# Patient Record
Sex: FEMALE | Race: WHITE | NOT HISPANIC OR LATINO | Employment: UNEMPLOYED | URBAN - METROPOLITAN AREA
[De-identification: names, ages, dates, MRNs, and addresses within clinical notes are randomized per-mention and may not be internally consistent; named-entity substitution may affect disease eponyms.]

---

## 2017-01-05 RX ORDER — MODAFINIL 100 MG/1
TABLET ORAL
Qty: 60 TABLET | Refills: 0 | Status: SHIPPED | OUTPATIENT
Start: 2017-01-05 | End: 2017-02-03 | Stop reason: SDUPTHER

## 2017-01-12 ENCOUNTER — NURSE TRIAGE (OUTPATIENT)
Dept: ADMINISTRATIVE | Facility: CLINIC | Age: 32
End: 2017-01-12

## 2017-01-12 NOTE — TELEPHONE ENCOUNTER
Reason for Disposition   Caller has already spoken with another triager and has no further questions.    Protocols used: ST NO CONTACT OR DUPLICATE CONTACT CALL-A-AH

## 2017-02-07 RX ORDER — METHOCARBAMOL 500 MG/1
TABLET, FILM COATED ORAL
Qty: 40 TABLET | Refills: 0 | Status: SHIPPED | OUTPATIENT
Start: 2017-02-07 | End: 2017-02-09

## 2017-02-07 RX ORDER — MODAFINIL 100 MG/1
TABLET ORAL
Qty: 60 TABLET | Refills: 0 | Status: SHIPPED | OUTPATIENT
Start: 2017-02-07 | End: 2017-03-12 | Stop reason: SDUPTHER

## 2017-02-09 ENCOUNTER — PATIENT MESSAGE (OUTPATIENT)
Dept: FAMILY MEDICINE | Facility: CLINIC | Age: 32
End: 2017-02-09

## 2017-02-09 ENCOUNTER — OFFICE VISIT (OUTPATIENT)
Dept: FAMILY MEDICINE | Facility: CLINIC | Age: 32
End: 2017-02-09
Payer: COMMERCIAL

## 2017-02-09 VITALS
WEIGHT: 194 LBS | DIASTOLIC BLOOD PRESSURE: 70 MMHG | RESPIRATION RATE: 16 BRPM | HEIGHT: 64 IN | HEART RATE: 70 BPM | SYSTOLIC BLOOD PRESSURE: 126 MMHG | BODY MASS INDEX: 33.12 KG/M2

## 2017-02-09 DIAGNOSIS — J45.20 ALLERGIC BRONCHITIS, MILD INTERMITTENT, UNCOMPLICATED: ICD-10-CM

## 2017-02-09 DIAGNOSIS — D47.09 MASTOCYTOSIS: ICD-10-CM

## 2017-02-09 DIAGNOSIS — R51.9 HEADACHE, UNSPECIFIED HEADACHE TYPE: ICD-10-CM

## 2017-02-09 DIAGNOSIS — K21.9 GASTROESOPHAGEAL REFLUX DISEASE, ESOPHAGITIS PRESENCE NOT SPECIFIED: ICD-10-CM

## 2017-02-09 DIAGNOSIS — M79.7 FIBROMYALGIA: ICD-10-CM

## 2017-02-09 DIAGNOSIS — D50.9 IRON DEFICIENCY ANEMIA, UNSPECIFIED IRON DEFICIENCY ANEMIA TYPE: Primary | ICD-10-CM

## 2017-02-09 LAB
ALBUMIN SERPL BCP-MCNC: 4.2 G/DL
ALP SERPL-CCNC: 87 U/L
ALT SERPL W/O P-5'-P-CCNC: 19 U/L
ANION GAP SERPL CALC-SCNC: 10 MMOL/L
ANISOCYTOSIS BLD QL SMEAR: SLIGHT
AST SERPL-CCNC: 20 U/L
BASOPHILS # BLD AUTO: 0.05 K/UL
BASOPHILS NFR BLD: 0.5 %
BILIRUB SERPL-MCNC: 0.1 MG/DL
BUN SERPL-MCNC: 11 MG/DL
CALCIUM SERPL-MCNC: 9.4 MG/DL
CHLORIDE SERPL-SCNC: 106 MMOL/L
CO2 SERPL-SCNC: 24 MMOL/L
CREAT SERPL-MCNC: 0.9 MG/DL
DIFFERENTIAL METHOD: ABNORMAL
EOSINOPHIL # BLD AUTO: 0.2 K/UL
EOSINOPHIL NFR BLD: 1.6 %
ERYTHROCYTE [DISTWIDTH] IN BLOOD BY AUTOMATED COUNT: 18.2 %
EST. GFR  (AFRICAN AMERICAN): >60 ML/MIN/1.73 M^2
EST. GFR  (NON AFRICAN AMERICAN): >60 ML/MIN/1.73 M^2
GLUCOSE SERPL-MCNC: 98 MG/DL
HCT VFR BLD AUTO: 32.4 %
HGB BLD-MCNC: 9.4 G/DL
LYMPHOCYTES # BLD AUTO: 2.2 K/UL
LYMPHOCYTES NFR BLD: 20.4 %
MCH RBC QN AUTO: 19.6 PG
MCHC RBC AUTO-ENTMCNC: 29 %
MCV RBC AUTO: 68 FL
MONOCYTES # BLD AUTO: 0.6 K/UL
MONOCYTES NFR BLD: 5.3 %
NEUTROPHILS # BLD AUTO: 7.9 K/UL
NEUTROPHILS NFR BLD: 72.6 %
PLATELET # BLD AUTO: 332 K/UL
PMV BLD AUTO: 11.9 FL
POIKILOCYTOSIS BLD QL SMEAR: SLIGHT
POTASSIUM SERPL-SCNC: 3.4 MMOL/L
PROT SERPL-MCNC: 8.1 G/DL
RBC # BLD AUTO: 4.8 M/UL
SCHISTOCYTES BLD QL SMEAR: PRESENT
SODIUM SERPL-SCNC: 140 MMOL/L
WBC # BLD AUTO: 10.97 K/UL

## 2017-02-09 PROCEDURE — 99214 OFFICE O/P EST MOD 30 MIN: CPT | Mod: S$GLB,,, | Performed by: FAMILY MEDICINE

## 2017-02-09 PROCEDURE — 80053 COMPREHEN METABOLIC PANEL: CPT

## 2017-02-09 PROCEDURE — 83540 ASSAY OF IRON: CPT

## 2017-02-09 PROCEDURE — 99999 PR PBB SHADOW E&M-EST. PATIENT-LVL III: CPT | Mod: PBBFAC,,, | Performed by: FAMILY MEDICINE

## 2017-02-09 PROCEDURE — 85025 COMPLETE CBC W/AUTO DIFF WBC: CPT

## 2017-02-09 PROCEDURE — 82728 ASSAY OF FERRITIN: CPT

## 2017-02-09 PROCEDURE — 36415 COLL VENOUS BLD VENIPUNCTURE: CPT | Mod: S$GLB,,, | Performed by: FAMILY MEDICINE

## 2017-02-09 RX ORDER — DEXLANSOPRAZOLE 60 MG/1
60 CAPSULE, DELAYED RELEASE ORAL DAILY
Qty: 30 CAPSULE | Refills: 1 | Status: SHIPPED | OUTPATIENT
Start: 2017-02-09 | End: 2017-02-09 | Stop reason: SDUPTHER

## 2017-02-09 RX ORDER — NORTRIPTYLINE HYDROCHLORIDE 10 MG/1
10 CAPSULE ORAL NIGHTLY
Qty: 30 CAPSULE | Refills: 3 | Status: SHIPPED | OUTPATIENT
Start: 2017-02-09 | End: 2017-11-03

## 2017-02-09 RX ORDER — DEXLANSOPRAZOLE 60 MG/1
60 CAPSULE, DELAYED RELEASE ORAL DAILY
Qty: 30 CAPSULE | Refills: 3 | Status: SHIPPED | OUTPATIENT
Start: 2017-02-09 | End: 2017-11-03

## 2017-02-09 RX ORDER — METHOCARBAMOL 500 MG/1
500 TABLET, FILM COATED ORAL 2 TIMES DAILY PRN
Qty: 40 TABLET | Refills: 1 | Status: SHIPPED | OUTPATIENT
Start: 2017-02-09 | End: 2017-03-07 | Stop reason: SDUPTHER

## 2017-02-09 RX ORDER — MONTELUKAST SODIUM 10 MG/1
10 TABLET ORAL NIGHTLY
Qty: 30 TABLET | Refills: 5 | Status: SHIPPED | OUTPATIENT
Start: 2017-02-09 | End: 2017-03-11

## 2017-02-09 RX ORDER — DICLOFENAC SODIUM 10 MG/G
2 GEL TOPICAL 4 TIMES DAILY
Qty: 3 TUBE | Refills: 5 | Status: SHIPPED | OUTPATIENT
Start: 2017-02-09 | End: 2017-11-03

## 2017-02-09 RX ORDER — FLUTICASONE PROPIONATE 220 UG/1
1 AEROSOL, METERED RESPIRATORY (INHALATION) 2 TIMES DAILY
Qty: 12 G | Refills: 0
Start: 2017-02-09 | End: 2019-01-15

## 2017-02-09 NOTE — PROGRESS NOTES
Subjective:       Patient ID: Krystal Wren is a 31 y.o. female.    Chief Complaint: Annual Exam    HPI  31 year old female with asthma, anemia, fibromyalgia, depression, SANTIAGO, insomnia and anxiety presents for routine visit. She says she isn't sleeping well. She does better when she uses her Cpap. She notes that her reflux is still bad and that she has issues with stomach aches at times. She is curious about gluten sensitivity, but saw GI and was convinced this may not be the case.     PMH, PSH, ALLERGIES, SH, FH reviewed in nurse's notes above  Medications reconciled in the nurse's notes      Review of Systems   Constitutional: Negative for chills and fever.   HENT: Negative for congestion, ear pain, postnasal drip, rhinorrhea, sore throat and trouble swallowing.    Eyes: Negative for redness and itching.   Respiratory: Negative for cough, shortness of breath and wheezing.    Cardiovascular: Negative for chest pain and palpitations.   Gastrointestinal: Positive for abdominal pain and diarrhea. Negative for nausea and vomiting.   Genitourinary: Negative for dysuria and frequency.   Skin: Negative for rash.   Neurological: Negative for weakness and headaches.   Psychiatric/Behavioral: Positive for sleep disturbance. The patient is nervous/anxious.        Objective:      Physical Exam   Constitutional: She is oriented to person, place, and time. She appears well-developed. No distress.   HENT:   Head: Normocephalic and atraumatic.   Eyes: Conjunctivae are normal. Pupils are equal, round, and reactive to light.   Neck: Normal range of motion. Neck supple. No thyromegaly present.   Cardiovascular: Normal rate, regular rhythm, normal heart sounds and intact distal pulses.    No murmur heard.  Pulmonary/Chest: Effort normal and breath sounds normal. No respiratory distress. She has no wheezes.   Abdominal: Soft. Bowel sounds are normal. There is no tenderness.   Musculoskeletal: Normal range of motion. She  exhibits no edema.   Lymphadenopathy:     She has no cervical adenopathy.   Neurological: She is alert and oriented to person, place, and time.   Skin: Skin is warm and dry. No rash noted.   Psychiatric: She has a normal mood and affect. Her behavior is normal.   Nursing note and vitals reviewed.       Assessment/Plan:       Krystal was seen today for annual exam.    Diagnoses and all orders for this visit:    Iron deficiency anemia, unspecified iron deficiency anemia type  -     CBC auto differential; Future  -     Comprehensive metabolic panel; Future  -     Iron and TIBC; Future  -     Ferritin; Future    Headache, unspecified headache type  -     methocarbamol (ROBAXIN) 500 MG Tab; Take 1 tablet (500 mg total) by mouth 2 (two) times daily as needed.    Gastroesophageal reflux disease, esophagitis presence not specified  -     dexlansoprazole (DEXILANT) 60 mg capsule; Take 1 capsule (60 mg total) by mouth once daily.    Allergic bronchitis  -     fluticasone (FLOVENT HFA) 220 mcg/actuation inhaler; Inhale 1 puff into the lungs 2 (two) times daily. Controller  -     montelukast (SINGULAIR) 10 mg tablet; Take 1 tablet (10 mg total) by mouth every evening.      RTC if condition acutely worsens or any other concerns, otherwise RTC as scheduled

## 2017-02-09 NOTE — MR AVS SNAPSHOT
[address] [address]  Phone: [phone]  Fax: [phone]                  Krystal Sarmiento [place]   2017 12:45 PM   Office Visit    Description:  Female : 1985   Provider:  Funmilayo Gutierrez MD   Department:  [facility]           Reason for Visit     Annual Exam           Diagnoses this Visit        Comments    Iron deficiency anemia, unspecified iron deficiency anemia type    -  Primary     Headache, unspecified headache type         Gastroesophageal reflux disease, esophagitis presence not specified         Allergic bronchitis, mild intermittent, uncomplicated         Mastocytosis         Fibromyalgia                To Do List           Goals (5 Years of Data)     None       These Medications        Disp Refills Start End    fluticasone (FLOVENT HFA) 220 mcg/actuation inhaler 12 g 0 2017    Inhale 1 puff into the lungs 2 (two) times daily. Controller - Inhalation    Pharmacy: [facility] [place] [address] AT [facility] [address] Ph #: [phone]       nortriptyline (PAMELOR) 10 MG capsule 30 capsule 3 2017    Take 1 capsule (10 mg total) by mouth every evening. - Oral    Pharmacy: [facility] [place] [address] AT [facility] [address] Ph #: [phone]       dexlansoprazole (DEXILANT) 60 mg capsule 30 capsule 3 2017    Take 1 capsule (60 mg total) by mouth once daily. - Oral    Pharmacy: [facility] [address] AT [facility] [address] Ph #: [phone]       montelukast (SINGULAIR) 10 mg tablet 30 tablet 5 2017 3/11/2017    Take 1 tablet (10 mg total) by mouth every evening. - Oral    Pharm[facility]: [facility] [address] [place] [address] [facility] [address] Ph #: [phone]       methocarbamol (ROBAXIN) 500 MG Tab 40  tablet 1 2/9/2017     Take 1 tablet (500 mg total) by mouth 2 (two) times daily as needed. - Oral    Pharmacy: [facility] [address] [place] [address] [place] [address] AT [facility] [address] Ph #: [phone]       diclofenac sodium (VOLTAREN) 1 % Gel 3 Tube 5 2/9/2017 2/19/2017    Apply 2 g topically 4 (four) times daily. - Topical    Pharmacy: [facility] [address] AT [facility] [address] Ph #: [phone]         [facility]     [facility] Nurse [facility] Line - 24/7 Assistance  Registered nurses in the [facility] Center provide clinical advisement, health education, appointment booking, and other advisory services.  Call for this free service at [phone].             Medications           Message regarding Medications     Verify the changes and/or additions to your medication regime listed below are the same as discussed with your clinician today.  If any of these changes or additions are incorrect, please notify your healthcare provider.        START taking these NEW medications        Refills    fluticasone (FLOVENT HFA) 220 mcg/actuation inhaler 0    Sig: Inhale 1 puff into the lungs 2 (two) times daily. Controller    Class: No Print    Route: Inhalation    dexlansoprazole (DEXILANT) 60 mg capsule 3    Sig: Take 1 capsule (60 mg total) by mouth once daily.    Class: Normal    Route: Oral    montelukast (SINGULAIR) 10 mg tablet 5    Sig: Take 1 tablet (10 mg total) by mouth every evening.    Class: Normal    Route: Oral    diclofenac sodium (VOLTAREN) 1 % Gel 5    Sig: Apply 2 g topically 4 (four) times daily.    Class: Normal    Route: Topical      CHANGE how you are taking these medications     Start Taking Instead of    methocarbamol (ROBAXIN) 500 MG Tab methocarbamol (ROBAXIN) 500 MG Tab    Dosage:  Take 1 tablet (500 mg total) by mouth 2 (two) times daily as needed. Dosage:  TAKE 1 TABLET(500 MG) BY MOUTH TWICE DAILY AS  NEEDED      STOP taking these medications     melatonin 5 mg TbDL Take by mouth every evening.     pantoprazole (PROTONIX) 40 MG tablet Take 40 mg by mouth 2 (two) times daily.     fluticasone (FLONASE) 50 mcg/actuation nasal spray 2 sprays each nostril daily as needed for nasal congestion    duloxetine (CYMBALTA) 30 MG capsule Take 1 capsule (30 mg total) by mouth once daily.    metoprolol tartrate (LOPRESSOR) 100 MG tablet Take 0.5 tablets (50 mg total) by mouth 2 (two) times daily.           Verify that the below list of medications is an accurate representation of the medications you are currently taking.  If none reported, the list may be blank. If incorrect, please contact your healthcare provider. Carry this list with you in case of emergency.           Current Medications     blood sugar diagnostic Strp Check blood sugar as needed    blood-glucose meter kit Use as instructed    cholecalciferol, vitamin D3, (MAXIMUM D3) 10,000 unit Cap Take by mouth.    dexlansoprazole (DEXILANT) 60 mg capsule Take 1 capsule (60 mg total) by mouth once daily.    diclofenac sodium (VOLTAREN) 1 % Gel Apply 2 g topically 4 (four) times daily.    fluticasone (FLOVENT HFA) 220 mcg/actuation inhaler Inhale 1 puff into the lungs 2 (two) times daily. Controller    MAGNESIUM MALATE MISC by Misc.(Non-Drug; Combo Route) route.    methocarbamol (ROBAXIN) 500 MG Tab Take 1 tablet (500 mg total) by mouth 2 (two) times daily as needed.    modafinil (PROVIGIL) 100 MG Tab TAKE 2 TABLETS(200 MG) BY MOUTH EVERY DAY    montelukast (SINGULAIR) 10 mg tablet Take 1 tablet (10 mg total) by mouth every evening.    nortriptyline (PAMELOR) 10 MG capsule Take 1 capsule (10 mg total) by mouth every evening.    promethazine (PHENERGAN) 12.5 MG Tab Take 1 tablet (12.5 mg total) by mouth 4 (four) times daily as needed.    TRINESSA, 28, 0.18/0.215/0.25 mg-35 mcg (28) tablet TAKE 1 TABLET BY MOUTH EVERY DAY    turmeric root extract 500 mg Cap Take by mouth.  "          Clinical Reference Information           Your Vitals Were     BP Pulse Resp Height Weight Last Period    126/70 (BP Location: Left arm, Patient Position: Sitting, BP Method: Manual) 70 16 5' 4" (1.626 m) 88 kg (194 lb 0.1 oz) 01/24/2017    BMI                33.3 kg/m2          Blood Pressure          Most Recent Value    BP  126/70      Allergies as of 2/9/2017     Ceclor [Cefaclor]      Immunizations Administered on Date of Encounter - 2/9/2017     None      Orders Placed During Today's Visit      Normal Orders This Visit    Ambulatory referral to Allergy     Ambulatory referral to Pain Clinic     Future Labs/Procedures Expected by Expires    CBC auto differential  2/9/2017 4/10/2018    Comprehensive metabolic panel  2/9/2017 4/10/2018    Ferritin  2/9/2017 2/9/2018    Iron and TIBC  2/9/2017 4/10/2018      Smoking Cessation     If you would like to quit smoking:   You may be eligible for free services if you are a [place] resident and started smoking cigarettes before September 1, 1988.  Call the Smoking Cessation Trust ([facility]) toll free at [phone] or [phone].   Call 5-909-QUIT-NOW if you do not meet the above criteria.            Language Assistance Services     ATTENTION: Language assistance services are available, free of charge. Please call [phone].      ATENCIÓN: Si habla español, tiene a navarro disposición servicios gratuitos de asistencia lingüística. Llame al [phone].     [facility] Ý: N?u b?n nói Ti?ng Vi?t, có các d?ch v? h? tr? ngôn ng? mi?n phí dành cho b?n. G?i s? [phone].         [facility] complies with applicable Federal civil rights laws and does not discriminate on the basis of race, color, national origin, age, disability, or sex.        "

## 2017-02-10 ENCOUNTER — TELEPHONE (OUTPATIENT)
Dept: FAMILY MEDICINE | Facility: CLINIC | Age: 32
End: 2017-02-10

## 2017-02-10 ENCOUNTER — PATIENT MESSAGE (OUTPATIENT)
Dept: FAMILY MEDICINE | Facility: CLINIC | Age: 32
End: 2017-02-10

## 2017-02-10 LAB
FERRITIN SERPL-MCNC: 6 NG/ML
IRON SERPL-MCNC: 19 UG/DL
SATURATED IRON: 4 %
TOTAL IRON BINDING CAPACITY: 533 UG/DL
TRANSFERRIN SERPL-MCNC: 360 MG/DL

## 2017-02-21 ENCOUNTER — TELEPHONE (OUTPATIENT)
Dept: FAMILY MEDICINE | Facility: CLINIC | Age: 32
End: 2017-02-21

## 2017-02-22 ENCOUNTER — TELEPHONE (OUTPATIENT)
Dept: FAMILY MEDICINE | Facility: CLINIC | Age: 32
End: 2017-02-22

## 2017-02-23 ENCOUNTER — TELEPHONE (OUTPATIENT)
Dept: FAMILY MEDICINE | Facility: CLINIC | Age: 32
End: 2017-02-23

## 2017-02-23 NOTE — TELEPHONE ENCOUNTER
----- Message from Wilmer Davidson sent at 2017  9:06 AM CST -----  Contact: Desiree @ [facility] [place]  Krystal Sarmiento [place]  MRN: [id number]  : 1985  PCP: Funmilayo Gutierrez  Home Phone      [phone]  Work Phone      Not on file.  Mobile          [phone]      MESSAGE: would like status of PA for Dexilant -- request was sent on 17 -- no response     Call Desiree @ [phone]    PCP: Matt

## 2017-03-07 ENCOUNTER — TELEPHONE (OUTPATIENT)
Dept: FAMILY MEDICINE | Facility: CLINIC | Age: 32
End: 2017-03-07

## 2017-03-07 RX ORDER — METHOCARBAMOL 500 MG/1
500 TABLET, FILM COATED ORAL 2 TIMES DAILY PRN
Qty: 40 TABLET | Refills: 1 | Status: SHIPPED | OUTPATIENT
Start: 2017-03-07 | End: 2017-11-03 | Stop reason: SDUPTHER

## 2017-03-07 RX ORDER — TRAMADOL HYDROCHLORIDE AND ACETAMINOPHEN 37.5; 325 MG/1; MG/1
1 TABLET, FILM COATED ORAL EVERY 4 HOURS PRN
Qty: 30 TABLET | Refills: 0 | Status: SHIPPED | OUTPATIENT
Start: 2017-03-07 | End: 2017-03-17

## 2017-03-14 RX ORDER — MODAFINIL 100 MG/1
TABLET ORAL
Qty: 60 TABLET | Refills: 0 | Status: SHIPPED | OUTPATIENT
Start: 2017-03-14 | End: 2017-04-11 | Stop reason: SDUPTHER

## 2017-03-15 RX ORDER — MODAFINIL 100 MG/1
TABLET ORAL
Qty: 180 TABLET | Refills: 0 | OUTPATIENT
Start: 2017-03-15

## 2017-03-16 ENCOUNTER — PATIENT MESSAGE (OUTPATIENT)
Dept: FAMILY MEDICINE | Facility: CLINIC | Age: 32
End: 2017-03-16

## 2017-03-25 ENCOUNTER — NURSE TRIAGE (OUTPATIENT)
Dept: ADMINISTRATIVE | Facility: CLINIC | Age: 32
End: 2017-03-25

## 2017-03-25 NOTE — TELEPHONE ENCOUNTER
"bilat ankle welling R >L three times normal size around 115pm, denies SOB- + smoker.     Reason for Disposition   [1] Very swollen joint AND [2] no fever    Answer Assessment - Initial Assessment Questions  1. LOCATION: "Which joint is swollen?"      Chronic pain pt. Woke with numbness and tingling of both feet.   2. ONSET: "When did the swelling start?"     about 115pm   3. SIZE: "How large is the swelling?"      Three times normal size - belly really large - swollen to 7-8 mo preg size.   4. PAIN: "Is there any pain?" If so, ask: "How bad is it?" (Scale 1-10; or mild, moderate, severe)     Some abd pain today, Hx IBS, pt on new meds- causing dry mouth - drinking a lot of fluids , bladder pain radiated all over.   5. CAUSE: "What do you think caused the swollen joint?"     New med past 10 days   6. OTHER SYMPTOMS: "Do you have any other symptoms?" (e.g., fever, chest pain, difficulty breathing, calf pain)     No   7. PREGNANCY: "Is there any chance you are pregnant?" "When was your last menstrual period?"      No    Protocols used: ST ANKLE JOINT SWELLING-A-AH  denies redness, able to walk around, good uop- moreso since drinking a lot of fluids. rec to see MD with 24 hours. ER if SOB, CP. Pt states that she has had legs elevated for 30 mins with no improvement. Offered UC appt in am- pt states that she does not want to miss [facility]. Call back with questions.     "

## 2017-03-27 ENCOUNTER — OFFICE VISIT (OUTPATIENT)
Dept: PSYCHIATRY | Facility: CLINIC | Age: 32
End: 2017-03-27
Payer: COMMERCIAL

## 2017-03-27 VITALS
BODY MASS INDEX: 33.46 KG/M2 | DIASTOLIC BLOOD PRESSURE: 80 MMHG | HEIGHT: 64 IN | WEIGHT: 196 LBS | SYSTOLIC BLOOD PRESSURE: 137 MMHG | HEART RATE: 91 BPM

## 2017-03-27 DIAGNOSIS — F33.1 MAJOR DEPRESSIVE DISORDER, RECURRENT, MODERATE: ICD-10-CM

## 2017-03-27 PROCEDURE — 99205 OFFICE O/P NEW HI 60 MIN: CPT | Mod: S$GLB,,, | Performed by: NURSE PRACTITIONER

## 2017-03-27 PROCEDURE — 99999 PR PBB SHADOW E&M-EST. PATIENT-LVL II: CPT | Mod: PBBFAC,,, | Performed by: NURSE PRACTITIONER

## 2017-03-27 RX ORDER — ESCITALOPRAM OXALATE 5 MG/1
5 TABLET ORAL DAILY
Qty: 30 TABLET | Refills: 1 | Status: SHIPPED | OUTPATIENT
Start: 2017-03-27 | End: 2017-03-27 | Stop reason: SDUPTHER

## 2017-03-27 NOTE — PROGRESS NOTES
OUTPATIENT PSYCHIATRY INITIAL EVALUATION NOTE      Krystal Wren, a 31 y.o. female, presenting for initial evaluation visit. Met with patient.    Reason for Encounter: self-referral. Patient complains of Depression    Past Medical, Family and Social History: The patient's past medical, family and social history have been reviewed and updated as appropriate within the electronic medical record - see encounter notes.    History of Present Illness: Anxiety  Patient is here for evaluation of anxiety.  She has the following anxiety symptoms: none. Onset of symptoms was approximately several years ago.  Symptoms have been gradually worsening since that time. She denies current suicidal and homicidal ideation. Family history significant for alcoholism, substance abuse and possible mental illness. Adopted and only knows that bio parents abused drugs and had 9 children, Maternal Grandfather abused alcohol.Possible organic causes contributing are: none. Risk factors: positive family history in  grandfather and parents Previous treatment includes individual therapy.   She complains of the following medication side effects: Cymbalta caused SI.    Depression  Patient complains of depression. She complains of depressed mood, difficulty concentrating, fatigue, feelings of worthlessness/guilt, hopelessness, impaired memory, insomnia and psychomotor retardation. Onset was approximately several years ago. Symptoms have been gradually worsening since that time. Current symptoms include: depressed mood, difficulty concentrating, fatigue, feelings of worthlessness/guilt, hopelessness, impaired memory and insomnia. Patient denies psychomotor agitation, recurrent thoughts of death, suicidal attempt, suicidal thoughts with specific plan, suicidal thoughts without plan, weight gain and weight loss. Family history significant for alcoholism, anxiety, depression and substance abuse. Possible organic causes contributing are: none.  Risk factors: positive family history in  grandfather, parents and sister(s). Previous treatment includes individual therapy. She complains of the following side effects from the treatment: Suicidal thoughts with Cymbalta.            SUBJECTIVE:     Psychiatric Review Of Systems - Is patient experiencing or having changes in:  sleep: yes  appetite: yes  weight: no  energy/anergy: yes  interest/pleasure/anhedonia: yes  somatic symptoms: no  libido: N/A  guilty/hopelessness: yes  concentration: yes  S.I.B.s/risky behavior: no  SI/SA:  no    anxiety/panic: yes  Agoraphobia:  no  Social phobia:  no  Recurrent nightmares:  no  hyper startle response:  no  Avoidance: no  Recurrent thoughts:  no  Recurrent behaviors:  no    Irritability: yes  Racing thoughts: yes  Impulsive behaviors: yes, jumping into things, spending money she doesn't have trying to make money  Pressured speech:  no    Paranoia:no  Delusions: no  AVH:no    Screens and Inventories      Past Psychiatric History:  Previous Medication Trials: yes, Amitriptyline, Cymbalta, and Nortriptyline for pain    Previous Psychiatric Hospitalizations: no   Previous Suicide Attempts: no   History of Violence: no  Outpatient Psychiatrist: no    Social History:  Marital Status:   Children: 2   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  Education: some college  Special Ed: no  : no  Mosque: Muslim  Housing Status: lives with  and 2 children   Hobbies/Leisure time: watches TV  History of phys/sexual abuse: no  Access to gun: no      Substance Abuse History:  Recreational Drugs: Denies  Use of Alcohol: denied  Rehab History:no   Tobacco Use:no  Use of Caffeine: caffeinated soft drinks 4 /day  Use of OTC: no  Legal consequences of chemical use: no    Legal History:  Past Charges/Incarcerations:no    Pending charges:no     Medical ROS  General ROS: negative for - chills, fatigue or fever  Respiratory ROS: no cough, shortness of breath, or  "wheezing  Cardiovascular ROS: no chest pain or dyspnea on exertion  Gastrointestinal ROS: no abdominal pain, change in bowel habits, or black or bloody stools  Musculoskeletal ROS: negative for - gait disturbance, joint stiffness, positive muscle pain or muscular weakness  Neurological ROS: negative for - confusion, dizziness, gait disturbance, headaches, impaired coordination/balance, seizures or visual changes    Nutritional Screening: Considering the patient's height and weight, medications, medical history and preferences, should a referral be made to the dietitian? no      OBJECTIVE:     Past Medical History:   Diagnosis Date    Fibromyalgia     Fibromyalgia 2000    Hyperlipidemia     Hypertension     Irritable bowel syndrome     Migraine        History of Seizures or TBI:    Musculoskeletal  Muscle Strength/Tone:  not examined   Gait & Station:  non-ataxic, slow     Relevant Elements of Neurological Exam: normal gait  AIMS:  n/a    Constitutional  Vitals:  Most recent vital signs, dated less than 90 days prior to this appointment, were reviewed.    Vitals:    03/27/17 1505   BP: 137/80   Pulse: 91   Weight: 88.9 kg (196 lb)   Height: 5' 4" (1.626 m)          Allergies:  Review of patient's allergies indicates:   Allergen Reactions    Ceclor [cefaclor] Hives         Laboratory Data  No visits with results within 1 Month(s) from this visit.  Latest known visit with results is:    Office Visit on 02/09/2017   Component Date Value Ref Range Status    WBC 02/09/2017 10.97  3.90 - 12.70 K/uL Final    RBC 02/09/2017 4.80  4.00 - 5.40 M/uL Final    Hemoglobin 02/09/2017 9.4* 12.0 - 16.0 g/dL Final    Hematocrit 02/09/2017 32.4* 37.0 - 48.5 % Final    MCV 02/09/2017 68* 82 - 98 fL Final    MCH 02/09/2017 19.6* 27.0 - 31.0 pg Final    MCHC 02/09/2017 29.0* 32.0 - 36.0 % Final    RDW 02/09/2017 18.2* 11.5 - 14.5 % Final    Platelets 02/09/2017 332  150 - 350 K/uL Final    MPV 02/09/2017 11.9  9.2 - 12.9 " fL Final    Gran # 02/09/2017 7.9* 1.8 - 7.7 K/uL Final    Lymph # 02/09/2017 2.2  1.0 - 4.8 K/uL Final    Mono # 02/09/2017 0.6  0.3 - 1.0 K/uL Final    Eos # 02/09/2017 0.2  0.0 - 0.5 K/uL Final    Baso # 02/09/2017 0.05  0.00 - 0.20 K/uL Final    Gran% 02/09/2017 72.6  38.0 - 73.0 % Final    Lymph% 02/09/2017 20.4  18.0 - 48.0 % Final    Mono% 02/09/2017 5.3  4.0 - 15.0 % Final    Eosinophil% 02/09/2017 1.6  0.0 - 8.0 % Final    Basophil% 02/09/2017 0.5  0.0 - 1.9 % Final    Aniso 02/09/2017 Slight   Final    Poik 02/09/2017 Slight   Final    Schistocytes 02/09/2017 Present   Final    Differential Method 02/09/2017 Automated   Final    Sodium 02/09/2017 140  136 - 145 mmol/L Final    Potassium 02/09/2017 3.4* 3.5 - 5.1 mmol/L Final    Chloride 02/09/2017 106  95 - 110 mmol/L Final    CO2 02/09/2017 24  23 - 29 mmol/L Final    Glucose 02/09/2017 98  70 - 110 mg/dL Final    BUN, Bld 02/09/2017 11  6 - 20 mg/dL Final    Creatinine 02/09/2017 0.9  0.5 - 1.4 mg/dL Final    Calcium 02/09/2017 9.4  8.7 - 10.5 mg/dL Final    Total Protein 02/09/2017 8.1  6.0 - 8.4 g/dL Final    Albumin 02/09/2017 4.2  3.5 - 5.2 g/dL Final    Total Bilirubin 02/09/2017 0.1  0.1 - 1.0 mg/dL Final    Alkaline Phosphatase 02/09/2017 87  55 - 135 U/L Final    AST 02/09/2017 20  10 - 40 U/L Final    ALT 02/09/2017 19  10 - 44 U/L Final    Anion Gap 02/09/2017 10  8 - 16 mmol/L Final    eGFR if African American 02/09/2017 >60  >60 mL/min/1.73 m^2 Final    eGFR if non African American 02/09/2017 >60  >60 mL/min/1.73 m^2 Final    Iron 02/09/2017 19* 30 - 160 ug/dL Final    Transferrin 02/09/2017 360  200 - 375 mg/dL Final    TIBC 02/09/2017 533* 250 - 450 ug/dL Final    Saturated Iron 02/09/2017 4* 20 - 50 % Final    Ferritin 02/09/2017 6* 20.0 - 300.0 ng/mL Final         Medications  Outpatient Encounter Prescriptions as of 3/27/2017   Medication Sig Dispense Refill    blood sugar diagnostic Strp Check blood  sugar as needed 50 each 0    blood-glucose meter kit Use as instructed 1 each 0    cholecalciferol, vitamin D3, (MAXIMUM D3) 10,000 unit Cap Take by mouth.      dexlansoprazole (DEXILANT) 60 mg capsule Take 1 capsule (60 mg total) by mouth once daily. 30 capsule 3    diclofenac sodium (VOLTAREN) 1 % Gel Apply 2 g topically 4 (four) times daily. 3 Tube 5    fluticasone (FLOVENT HFA) 220 mcg/actuation inhaler Inhale 1 puff into the lungs 2 (two) times daily. Controller 12 g 0    MAGNESIUM MALATE MISC by Misc.(Non-Drug; Combo Route) route.      methocarbamol (ROBAXIN) 500 MG Tab Take 1 tablet (500 mg total) by mouth 2 (two) times daily as needed. 40 tablet 1    modafinil (PROVIGIL) 100 MG Tab TAKE 2 TABLETS(200 MG) BY MOUTH EVERY DAY 60 tablet 0    nortriptyline (PAMELOR) 10 MG capsule Take 1 capsule (10 mg total) by mouth every evening. 30 capsule 3    promethazine (PHENERGAN) 12.5 MG Tab Take 1 tablet (12.5 mg total) by mouth 4 (four) times daily as needed. 15 tablet 0    TRINESSA, 28, 0.18/0.215/0.25 mg-35 mcg (28) tablet TAKE 1 TABLET BY MOUTH EVERY DAY 28 tablet 0    turmeric root extract 500 mg Cap Take by mouth.       No facility-administered encounter medications on file as of 3/27/2017.          Psychiatric Mental Status Exam  MENTAL STATUS EXAM  Appearance:No distress, appropriate grooming  Behavior: Cooperative  Speech: Regular Rhythm Rate  Thought process: Linear  Thought content: Without suicidal / homicidal ideation, no auditory / visual hallucinations  Mood: Good  Affect: Mood congruent, appropriate  Cognition: intact  Insight: intact  Judgment: intact      Psychotherapy:  · Target symptoms: depression, anxiety   · Why chosen therapy is appropriate versus another modality: relevant to diagnosis  · Outcome monitoring methods: self-report  · Therapeutic intervention type: supportive psychotherapy  · Topics discussed/themes: stress related to medical comorbidities  · The patient's response to  the intervention is accepting. The patient's progress toward treatment goals is good.       ASSESSMENT     Impression: Major Depressive Disorder       Treatment Goals:  Specify outcomes written in observable, behavioral terms:   Anxiety: eliminating all anxiety symptoms (SCL-90-R scores in normal range)  Depression: decreasing worthlessness (or other schemata-specify hopelessness) and eliminating all depressive symptoms (BDI score <10 for 1 month)    TREATMENT PLAN     · Medication Management: Continue current medications.  · Labs:  · The treatment plan and follow up plan were reviewed with the patient.  · Discussed with patient informed consent, risks vs. benefits, alternative treatments, side effect profile and the inherent unpredictability of individual responses to these treatments. The patient expresses understanding of the above and displays the capacity to agree with this current plan.  · Encouraged Patient to keep future appointments. Also informed pt regarding transition of care with new provider starting July 1st 2016  · Take medications as prescribed and abstain from substance abuse.   · In the event of an emergency patient was advised to go to the emergency room.    Return to Clinic: 1 month    Counseling/ psychotherapy time: 30  Total time: 60      Amanda Balderas NP

## 2017-03-28 RX ORDER — ESCITALOPRAM OXALATE 5 MG/1
TABLET ORAL
Qty: 90 TABLET | Refills: 1 | Status: SHIPPED | OUTPATIENT
Start: 2017-03-28 | End: 2017-11-03

## 2017-04-03 ENCOUNTER — PATIENT MESSAGE (OUTPATIENT)
Dept: FAMILY MEDICINE | Facility: CLINIC | Age: 32
End: 2017-04-03

## 2017-04-11 RX ORDER — MODAFINIL 100 MG/1
TABLET ORAL
Qty: 60 TABLET | Refills: 0 | Status: SHIPPED | OUTPATIENT
Start: 2017-04-11 | End: 2017-05-17 | Stop reason: SDUPTHER

## 2017-04-19 ENCOUNTER — PATIENT MESSAGE (OUTPATIENT)
Dept: FAMILY MEDICINE | Facility: CLINIC | Age: 32
End: 2017-04-19

## 2017-04-26 ENCOUNTER — PATIENT MESSAGE (OUTPATIENT)
Dept: ALLERGY | Facility: CLINIC | Age: 32
End: 2017-04-26

## 2017-04-26 ENCOUNTER — TELEPHONE (OUTPATIENT)
Dept: ALLERGY | Facility: CLINIC | Age: 32
End: 2017-04-26

## 2017-04-26 NOTE — TELEPHONE ENCOUNTER
Attempted to call patient to reschedule appointment since Dr. Gage is out of clinic. There was no answer, left voicemail asking patient to return call. I also send a my chart message to patient.

## 2017-05-17 RX ORDER — MODAFINIL 100 MG/1
TABLET ORAL
Qty: 60 TABLET | Refills: 0 | Status: SHIPPED | OUTPATIENT
Start: 2017-05-17 | End: 2017-06-14 | Stop reason: SDUPTHER

## 2017-06-14 RX ORDER — MODAFINIL 100 MG/1
TABLET ORAL
Qty: 60 TABLET | Refills: 0 | Status: SHIPPED | OUTPATIENT
Start: 2017-06-14 | End: 2017-07-19 | Stop reason: SDUPTHER

## 2017-07-18 ENCOUNTER — PATIENT MESSAGE (OUTPATIENT)
Dept: FAMILY MEDICINE | Facility: CLINIC | Age: 32
End: 2017-07-18

## 2017-07-18 RX ORDER — METHOCARBAMOL 500 MG/1
TABLET, FILM COATED ORAL
Qty: 40 TABLET | Refills: 0 | Status: SHIPPED | OUTPATIENT
Start: 2017-07-18 | End: 2017-09-22 | Stop reason: SDUPTHER

## 2017-07-19 DIAGNOSIS — D64.9 ANEMIA, UNSPECIFIED TYPE: Primary | ICD-10-CM

## 2017-07-19 RX ORDER — MODAFINIL 100 MG/1
TABLET ORAL
Qty: 60 TABLET | Refills: 1 | Status: SHIPPED | OUTPATIENT
Start: 2017-07-19 | End: 2017-09-22 | Stop reason: SDUPTHER

## 2017-07-19 RX ORDER — MODAFINIL 100 MG/1
TABLET ORAL
Qty: 60 TABLET | Refills: 1 | Status: CANCELLED | OUTPATIENT
Start: 2017-07-19

## 2017-07-20 ENCOUNTER — TELEPHONE (OUTPATIENT)
Dept: FAMILY MEDICINE | Facility: CLINIC | Age: 32
End: 2017-07-20

## 2017-07-24 ENCOUNTER — TELEPHONE (OUTPATIENT)
Dept: FAMILY MEDICINE | Facility: CLINIC | Age: 32
End: 2017-07-24

## 2017-07-25 ENCOUNTER — TELEPHONE (OUTPATIENT)
Dept: FAMILY MEDICINE | Facility: CLINIC | Age: 32
End: 2017-07-25

## 2017-07-27 NOTE — TELEPHONE ENCOUNTER
Spoke to pharmacy, states patient picked up prescription for Provigil on 7/22/2017.     Patient also requesting a prescription for CPAP full face mask and supplies. States she has a nasal CPAP and difficult to sleep with it at times. Faxed order and sleep study from 9/30/2016 along with demographics to [facility]. Fax# [phone].     [facility] phone# [phone].

## 2017-09-07 ENCOUNTER — OFFICE VISIT (OUTPATIENT)
Dept: URGENT CARE | Facility: CLINIC | Age: 32
End: 2017-09-07
Payer: COMMERCIAL

## 2017-09-07 VITALS
OXYGEN SATURATION: 98 % | SYSTOLIC BLOOD PRESSURE: 145 MMHG | RESPIRATION RATE: 20 BRPM | BODY MASS INDEX: 33.29 KG/M2 | HEIGHT: 64 IN | DIASTOLIC BLOOD PRESSURE: 100 MMHG | HEART RATE: 108 BPM | TEMPERATURE: 99 F | WEIGHT: 195 LBS

## 2017-09-07 DIAGNOSIS — J01.90 ACUTE NON-RECURRENT SINUSITIS, UNSPECIFIED LOCATION: Primary | ICD-10-CM

## 2017-09-07 PROCEDURE — 96372 THER/PROPH/DIAG INJ SC/IM: CPT | Mod: S$GLB,,, | Performed by: PHYSICIAN ASSISTANT

## 2017-09-07 PROCEDURE — 99214 OFFICE O/P EST MOD 30 MIN: CPT | Mod: 25,S$GLB,, | Performed by: PHYSICIAN ASSISTANT

## 2017-09-07 PROCEDURE — 3008F BODY MASS INDEX DOCD: CPT | Mod: S$GLB,,, | Performed by: PHYSICIAN ASSISTANT

## 2017-09-07 RX ORDER — BETAMETHASONE SODIUM PHOSPHATE AND BETAMETHASONE ACETATE 3; 3 MG/ML; MG/ML
6 INJECTION, SUSPENSION INTRA-ARTICULAR; INTRALESIONAL; INTRAMUSCULAR; SOFT TISSUE
Status: COMPLETED | OUTPATIENT
Start: 2017-09-07 | End: 2017-09-07

## 2017-09-07 RX ORDER — METHYLPREDNISOLONE 4 MG/1
4 TABLET ORAL
Qty: 1 PACKAGE | Refills: 0 | Status: SHIPPED | OUTPATIENT
Start: 2017-09-07 | End: 2017-09-13

## 2017-09-07 RX ORDER — AZITHROMYCIN 250 MG/1
TABLET, FILM COATED ORAL
Qty: 6 TABLET | Refills: 0 | Status: SHIPPED | OUTPATIENT
Start: 2017-09-07 | End: 2017-11-03

## 2017-09-07 RX ADMIN — BETAMETHASONE SODIUM PHOSPHATE AND BETAMETHASONE ACETATE 6 MG: 3; 3 INJECTION, SUSPENSION INTRA-ARTICULAR; INTRALESIONAL; INTRAMUSCULAR; SOFT TISSUE at 02:09

## 2017-09-07 NOTE — PATIENT INSTRUCTIONS
Sinusitis (Antibiotic Treatment)    The sinuses are air-filled spaces within the bones of the face. They connect to the inside of the nose. Sinusitis is an inflammation of the tissue lining the sinus cavity. Sinus inflammation can occur during a cold. It can also be due to allergies to pollens and other particles in the air. Sinusitis can cause symptoms of sinus congestion and fullness. A sinus infection causes fever, headache and facial pain. There is often green or yellow drainage from the nose or into the back of the throat (post-nasal drip). You have been given antibiotics to treat this condition.  Home care:  · Take the full course of antibiotics as instructed. Do not stop taking them, even if you feel better.  · Drink plenty of water, hot tea, and other liquids. This may help thin mucus. It also may promote sinus drainage.  · Heat may help soothe painful areas of the face. Use a towel soaked in hot water. Or,  the shower and direct the hot spray onto your face. Using a vaporizer along with a menthol rub at night may also help.   · An expectorant containing guaifenesin may help thin the mucus and promote drainage from the sinuses.  · Over-the-counter decongestants may be used unless a similar medicine was prescribed. Nasal sprays work the fastest. Use one that contains phenylephrine or oxymetazoline. First blow the nose gently. Then use the spray. Do not use these medicines more often than directed on the label or symptoms may get worse. You may also use tablets containing pseudoephedrine. Avoid products that combine ingredients, because side effects may be increased. Read labels. You can also ask the pharmacist for help. (NOTE: Persons with high blood pressure should not use decongestants. They can raise blood pressure.)  · Over-the-counter antihistamines may help if allergies contributed to your sinusitis.    · Do not use nasal rinses or irrigation during an acute sinus infection, unless told to by  your health care provider. Rinsing may spread the infection to other sinuses.  · Use acetaminophen or ibuprofen to control pain, unless another pain medicine was prescribed. (If you have chronic liver or kidney disease or ever had a stomach ulcer, talk with your doctor before using these medicines. Aspirin should never be used in anyone under 18 years of age who is ill with a fever. It may cause severe liver damage.)  · Don't smoke. This can worsen symptoms.  Follow-up care  Follow up with your healthcare provider or our staff if you are not improving within the next week.  When to seek medical advice  Call your healthcare provider if any of these occur:  · Facial pain or headache becoming more severe  · Stiff neck  · Unusual drowsiness or confusion  · Swelling of the forehead or eyelids  · Vision problems, including blurred or double vision  · Fever of 100.4ºF (38ºC) or higher, or as directed by your healthcare provider  · Seizure  · Breathing problems  · Symptoms not resolving within 10 days  Date Last Reviewed: 4/13/2015  © 8306-8088 [employer]. [address] [place] [address]. All rights reserved. This information is not intended as a substitute for professional medical care. Always follow your healthcare professional's instructions.      Please follow up with your Primary care provider within 2-5 days if your signs ans symptoms have not resolved or worsen.     If your condition worsens or fails to improve we recommend that you receive another evaluation at the emergency room immediately or contact your primary medical clinic to discuss your concerns.   You must understand that you have received an Urgent Care treatment only and that you may be released before all of your medical problems are known or treated. You, the patient, will arrange for follow up care as instructed.

## 2017-09-07 NOTE — PROGRESS NOTES
"Subjective:       Patient ID: Krystal Wren is a 31 y.o. female.    Vitals:  height is 5' 4" (1.626 m) and weight is 88.5 kg (195 lb). Her oral temperature is 98.5 °F (36.9 °C). Her blood pressure is 145/100 (abnormal) and her pulse is 108. Her respiration is 20 and oxygen saturation is 98%.     Chief Complaint: Sinus Problem    Sinus Problem   This is a new problem. The current episode started in the past 7 days. The problem has been gradually worsening since onset. There has been no fever. Her pain is at a severity of 0/10. She is experiencing no pain. Associated symptoms include congestion, coughing, ear pain, headaches, shortness of breath, sinus pressure and a sore throat. Pertinent negatives include no chills or hoarse voice. Past treatments include nothing. The treatment provided no relief.     Review of Systems   Constitution: Positive for fever. Negative for chills and malaise/fatigue.   HENT: Positive for congestion, ear pain, sinus pressure and sore throat. Negative for hoarse voice.    Eyes: Negative for discharge and redness.   Cardiovascular: Negative for chest pain, dyspnea on exertion and leg swelling.   Respiratory: Positive for cough, shortness of breath and wheezing. Negative for sputum production.    Musculoskeletal: Negative for myalgias.   Gastrointestinal: Negative for abdominal pain and nausea.   Neurological: Positive for headaches.       Objective:      Physical Exam   Constitutional: She is oriented to person, place, and time. She appears well-developed and well-nourished. She is cooperative.  Non-toxic appearance. She does not appear ill. No distress.   HENT:   Head: Normocephalic and atraumatic.   Right Ear: Hearing, external ear and ear canal normal. A middle ear effusion is present.   Left Ear: Hearing, external ear and ear canal normal. A middle ear effusion is present.   Nose: Mucosal edema and rhinorrhea present. No nasal deformity. No epistaxis. Right sinus exhibits " maxillary sinus tenderness. Right sinus exhibits no frontal sinus tenderness. Left sinus exhibits maxillary sinus tenderness. Left sinus exhibits no frontal sinus tenderness.   Mouth/Throat: Uvula is midline and mucous membranes are normal. No trismus in the jaw. Normal dentition. No uvula swelling. Posterior oropharyngeal erythema present.   Eyes: Conjunctivae and lids are normal. No scleral icterus.   Sclera clear bilat   Neck: Trachea normal, full passive range of motion without pain and phonation normal. Neck supple.   Cardiovascular: Normal rate, regular rhythm, normal heart sounds, intact distal pulses and normal pulses.    Pulmonary/Chest: Effort normal and breath sounds normal. No respiratory distress.   Abdominal: Soft. Normal appearance and bowel sounds are normal. She exhibits no distension. There is no tenderness.   Musculoskeletal: Normal range of motion. She exhibits no edema or deformity.   Neurological: She is alert and oriented to person, place, and time. She exhibits normal muscle tone. Coordination normal.   Skin: Skin is warm, dry and intact. She is not diaphoretic. No pallor.   Psychiatric: She has a normal mood and affect. Her speech is normal and behavior is normal. Judgment and thought content normal. Cognition and memory are normal.   Nursing note and vitals reviewed.      Assessment:       1. Acute non-recurrent sinusitis, unspecified location        Plan:         Acute non-recurrent sinusitis, unspecified location  -     betamethasone acetate-betamethasone sodium phosphate injection 6 mg; Inject 1 mL (6 mg total) into the muscle one time.  -     methylPREDNISolone (MEDROL DOSEPACK) 4 mg tablet; Take 1 tablet (4 mg total) by mouth as needed (Take as directed). Take as directed  Dispense: 1 Package; Refill: 0  -     azithromycin (ZITHROMAX Z-WINSOME) 250 MG tablet; Take 2 tablets (500 mg) on  Day 1,  followed by 1 tablet (250 mg) once daily on Days 2 through 5.  Dispense: 6 tablet; Refill:  0

## 2017-09-08 ENCOUNTER — NURSE TRIAGE (OUTPATIENT)
Dept: ADMINISTRATIVE | Facility: CLINIC | Age: 32
End: 2017-09-08

## 2017-09-08 NOTE — TELEPHONE ENCOUNTER
Reason for Disposition   Severe headache, states 'worst headache' of life    Protocols used: ST HEADACHE-A-OH    Headache at base of skull and goes forward to front of head per EC. States pain is a 9 out of 10. Seen at urgent care yesterday for sinus infection. Care advice given.

## 2017-09-09 ENCOUNTER — TELEPHONE (OUTPATIENT)
Dept: URGENT CARE | Facility: CLINIC | Age: 32
End: 2017-09-09

## 2017-09-22 RX ORDER — METHOCARBAMOL 500 MG/1
TABLET, FILM COATED ORAL
Qty: 40 TABLET | Refills: 0 | Status: SHIPPED | OUTPATIENT
Start: 2017-09-22 | End: 2017-11-03 | Stop reason: SDUPTHER

## 2017-09-26 RX ORDER — MODAFINIL 100 MG/1
TABLET ORAL
Qty: 60 TABLET | Refills: 0 | Status: SHIPPED | OUTPATIENT
Start: 2017-09-26 | End: 2017-11-03 | Stop reason: SDUPTHER

## 2017-10-30 RX ORDER — TRAMADOL HYDROCHLORIDE AND ACETAMINOPHEN 37.5; 325 MG/1; MG/1
TABLET, FILM COATED ORAL
Qty: 30 TABLET | Refills: 0 | OUTPATIENT
Start: 2017-10-30

## 2017-10-30 RX ORDER — MODAFINIL 100 MG/1
TABLET ORAL
Qty: 60 TABLET | Refills: 0 | OUTPATIENT
Start: 2017-10-30

## 2017-10-30 RX ORDER — METHOCARBAMOL 500 MG/1
TABLET, FILM COATED ORAL
Qty: 40 TABLET | Refills: 0 | OUTPATIENT
Start: 2017-10-30

## 2017-11-03 ENCOUNTER — OFFICE VISIT (OUTPATIENT)
Dept: FAMILY MEDICINE | Facility: CLINIC | Age: 32
End: 2017-11-03
Payer: COMMERCIAL

## 2017-11-03 VITALS
SYSTOLIC BLOOD PRESSURE: 136 MMHG | BODY MASS INDEX: 31.86 KG/M2 | HEART RATE: 120 BPM | RESPIRATION RATE: 18 BRPM | HEIGHT: 64 IN | DIASTOLIC BLOOD PRESSURE: 82 MMHG | WEIGHT: 186.63 LBS | TEMPERATURE: 98 F

## 2017-11-03 DIAGNOSIS — G47.33 OSA (OBSTRUCTIVE SLEEP APNEA): Primary | ICD-10-CM

## 2017-11-03 DIAGNOSIS — M79.7 FIBROMYALGIA: ICD-10-CM

## 2017-11-03 DIAGNOSIS — D64.9 ANEMIA, UNSPECIFIED TYPE: ICD-10-CM

## 2017-11-03 LAB
ALBUMIN SERPL BCP-MCNC: 4.2 G/DL
ALP SERPL-CCNC: 80 U/L
ALT SERPL W/O P-5'-P-CCNC: 22 U/L
ANION GAP SERPL CALC-SCNC: 11 MMOL/L
AST SERPL-CCNC: 21 U/L
BASOPHILS # BLD AUTO: 0.03 K/UL
BASOPHILS NFR BLD: 0.3 %
BILIRUB SERPL-MCNC: <0.1 MG/DL
BUN SERPL-MCNC: 8 MG/DL
CALCIUM SERPL-MCNC: 10 MG/DL
CHLORIDE SERPL-SCNC: 105 MMOL/L
CO2 SERPL-SCNC: 23 MMOL/L
CREAT SERPL-MCNC: 0.9 MG/DL
DIFFERENTIAL METHOD: ABNORMAL
EOSINOPHIL # BLD AUTO: 0 K/UL
EOSINOPHIL NFR BLD: 0.4 %
ERYTHROCYTE [DISTWIDTH] IN BLOOD BY AUTOMATED COUNT: 17.7 %
EST. GFR  (AFRICAN AMERICAN): >60 ML/MIN/1.73 M^2
EST. GFR  (NON AFRICAN AMERICAN): >60 ML/MIN/1.73 M^2
FERRITIN SERPL-MCNC: 7 NG/ML
GLUCOSE SERPL-MCNC: 129 MG/DL
HCT VFR BLD AUTO: 37.3 %
HGB BLD-MCNC: 10.9 G/DL
IRON SERPL-MCNC: 17 UG/DL
LYMPHOCYTES # BLD AUTO: 2.2 K/UL
LYMPHOCYTES NFR BLD: 20.3 %
MCH RBC QN AUTO: 21.4 PG
MCHC RBC AUTO-ENTMCNC: 29.2 G/DL
MCV RBC AUTO: 73 FL
MONOCYTES # BLD AUTO: 0.4 K/UL
MONOCYTES NFR BLD: 4.1 %
NEUTROPHILS # BLD AUTO: 8.1 K/UL
NEUTROPHILS NFR BLD: 74.9 %
PLATELET # BLD AUTO: 318 K/UL
PMV BLD AUTO: 12.3 FL
POTASSIUM SERPL-SCNC: 3.6 MMOL/L
PROT SERPL-MCNC: 8.4 G/DL
RBC # BLD AUTO: 5.1 M/UL
SATURATED IRON: 4 %
SODIUM SERPL-SCNC: 139 MMOL/L
TOTAL IRON BINDING CAPACITY: 485 UG/DL
TRANSFERRIN SERPL-MCNC: 328 MG/DL
WBC # BLD AUTO: 10.82 K/UL

## 2017-11-03 PROCEDURE — 85025 COMPLETE CBC W/AUTO DIFF WBC: CPT

## 2017-11-03 PROCEDURE — 99999 PR PBB SHADOW E&M-EST. PATIENT-LVL IV: CPT | Mod: PBBFAC,,, | Performed by: FAMILY MEDICINE

## 2017-11-03 PROCEDURE — 36415 COLL VENOUS BLD VENIPUNCTURE: CPT | Mod: S$GLB,,, | Performed by: FAMILY MEDICINE

## 2017-11-03 PROCEDURE — 83540 ASSAY OF IRON: CPT

## 2017-11-03 PROCEDURE — 80053 COMPREHEN METABOLIC PANEL: CPT

## 2017-11-03 PROCEDURE — 99215 OFFICE O/P EST HI 40 MIN: CPT | Mod: S$GLB,,, | Performed by: FAMILY MEDICINE

## 2017-11-03 PROCEDURE — 82728 ASSAY OF FERRITIN: CPT

## 2017-11-03 RX ORDER — CYCLOBENZAPRINE HCL 10 MG
10 TABLET ORAL NIGHTLY
Qty: 30 TABLET | Refills: 0 | Status: SHIPPED | OUTPATIENT
Start: 2017-11-03 | End: 2017-12-03

## 2017-11-03 RX ORDER — IBUPROFEN AND FAMOTIDINE 26.6; 8 MG/1; MG/1
TABLET ORAL 3 TIMES DAILY
COMMUNITY
End: 2017-11-03 | Stop reason: SDUPTHER

## 2017-11-03 RX ORDER — MODAFINIL 100 MG/1
TABLET ORAL
Qty: 60 TABLET | Refills: 0 | Status: SHIPPED | OUTPATIENT
Start: 2017-11-03 | End: 2017-12-22 | Stop reason: SDUPTHER

## 2017-11-03 RX ORDER — METHOCARBAMOL 500 MG/1
500 TABLET, FILM COATED ORAL 2 TIMES DAILY PRN
Qty: 60 TABLET | Refills: 1 | Status: SHIPPED | OUTPATIENT
Start: 2017-11-03 | End: 2018-02-05 | Stop reason: SDUPTHER

## 2017-11-03 RX ORDER — PANTOPRAZOLE SODIUM 40 MG/1
40 TABLET, DELAYED RELEASE ORAL 2 TIMES DAILY
COMMUNITY
End: 2018-02-02 | Stop reason: SDUPTHER

## 2017-11-03 RX ORDER — TRAMADOL HYDROCHLORIDE 50 MG/1
TABLET ORAL
Qty: 45 TABLET | Refills: 0 | Status: SHIPPED | OUTPATIENT
Start: 2017-11-03 | End: 2018-04-02 | Stop reason: SDUPTHER

## 2017-11-03 RX ORDER — TRAMADOL HYDROCHLORIDE 50 MG/1
TABLET ORAL
Refills: 0 | COMMUNITY
Start: 2017-09-21 | End: 2017-11-03 | Stop reason: SDUPTHER

## 2017-11-03 RX ORDER — AMITRIPTYLINE HYDROCHLORIDE 25 MG/1
TABLET, FILM COATED ORAL
Refills: 2 | COMMUNITY
Start: 2017-08-02 | End: 2017-11-03

## 2017-11-03 RX ORDER — BUTALBITAL, ACETAMINOPHEN AND CAFFEINE 50; 325; 40 MG/1; MG/1; MG/1
TABLET ORAL
Refills: 0 | COMMUNITY
Start: 2017-08-22 | End: 2019-01-15 | Stop reason: SDUPTHER

## 2017-11-03 RX ORDER — CHLORZOXAZONE 500 MG/1
TABLET ORAL
Refills: 2 | COMMUNITY
Start: 2017-08-02 | End: 2017-11-03

## 2017-11-03 RX ORDER — ONDANSETRON 8 MG/1
TABLET, ORALLY DISINTEGRATING ORAL
Refills: 2 | COMMUNITY
Start: 2017-08-22 | End: 2018-04-03 | Stop reason: SDUPTHER

## 2017-11-03 RX ORDER — DICLOFENAC SODIUM 10 MG/G
GEL TOPICAL
Refills: 5 | COMMUNITY
Start: 2017-08-22 | End: 2017-11-03

## 2017-11-03 RX ORDER — IBUPROFEN AND FAMOTIDINE 26.6; 8 MG/1; MG/1
1 TABLET ORAL 3 TIMES DAILY
Qty: 60 TABLET | Refills: 0 | Status: SHIPPED | OUTPATIENT
Start: 2017-11-03 | End: 2018-04-02

## 2017-11-03 NOTE — PROGRESS NOTES
Subjective:       Patient ID: Krystal Wren is a 31 y.o. female.    Chief Complaint: Follow-up (checkup) and Sinus Problem (x 1 week)    HPI  31 year old female comes in with c/o pain, fatigue, stomach issues. She says that she was started on lyrica, duexis, elavil, cloroxane, butrans patch from Dr. Sanchez. She says she felt swollen with lyrica. She says that butrans did help some with baseline pain. She was switched to fentanyl patches at that point. She had facet joint injections at that point. She says that these were terrible and didn't help.   She also c/o sinus congestion. She notes that she had some swelling in the left side of her face with ear stuffiness as well. This is improving.  She needs a mask for her cpap.  She is very frustrated, tired, feels weak and in pain. She would like to be considered for a handicap tag.  She also notes that she is very tired inspite of taking provigil    PMH, PSH, ALLERGIES, SH, FH reviewed in nurse's notes above  Medications reconciled in the nurse's notes        Review of Systems   Constitutional: Positive for fatigue. Negative for chills and fever.   HENT: Positive for congestion. Negative for ear pain, postnasal drip, rhinorrhea, sore throat and trouble swallowing.    Eyes: Negative for redness and itching.   Respiratory: Negative for cough, shortness of breath and wheezing.    Cardiovascular: Negative for chest pain and palpitations.   Gastrointestinal: Negative for abdominal pain, diarrhea, nausea and vomiting.   Genitourinary: Negative for dysuria and frequency.   Musculoskeletal: Positive for arthralgias, back pain and myalgias.   Skin: Negative for rash.   Neurological: Negative for weakness and headaches.   Psychiatric/Behavioral: Positive for sleep disturbance. The patient is nervous/anxious.        Objective:      Physical Exam   Constitutional: She is oriented to person, place, and time. She appears well-developed. No distress.   HENT:   Head:  Normocephalic and atraumatic.   Eyes: Conjunctivae are normal. Pupils are equal, round, and reactive to light.   Neck: Normal range of motion. Neck supple. No thyromegaly present.   Cardiovascular: Normal rate, regular rhythm, normal heart sounds and intact distal pulses.    Pulmonary/Chest: Effort normal and breath sounds normal. No respiratory distress. She has no wheezes.   Abdominal: Soft. Bowel sounds are normal. There is no tenderness.   Musculoskeletal: Normal range of motion. She exhibits no edema.   Lymphadenopathy:     She has no cervical adenopathy.   Neurological: She is alert and oriented to person, place, and time.   Skin: Skin is warm and dry. No rash noted.   Psychiatric: She has a normal mood and affect. Her behavior is normal.   Nursing note and vitals reviewed.       Assessment/Plan:       Problem List Items Addressed This Visit        Orthopedic    Fibromyalgia    Overview     Since age 14, mainly affects shoulders.         Relevant Orders    Ambulatory Referral to Physical/Occupational Therapy      Other Visit Diagnoses     SANTIAGO (obstructive sleep apnea)    -  Primary    Relevant Orders    CPAP/BIPAP SUPPLIES    Anemia, unspecified type          2:12 -->3:19   Over 60 minutes spent with suhas.    We have discussed that the treatment for fibromyalgia is to MOVE. I do not want her to use a handicap tag. She should try to be as mobile as possible. However, because of her worsening pain, I think that PT is appropriate. If no improvement, may consider tag.    Labs ordered to f/u on anemia. She will likely need iron infusions as she is intolerant to her oral iron.    RTC if condition acutely worsens or any other concerns, otherwise RTC as scheduled    Flu vacc today.

## 2017-11-07 ENCOUNTER — TELEPHONE (OUTPATIENT)
Dept: FAMILY MEDICINE | Facility: CLINIC | Age: 32
End: 2017-11-07

## 2017-11-07 DIAGNOSIS — D50.8 OTHER IRON DEFICIENCY ANEMIA: Primary | ICD-10-CM

## 2017-11-08 ENCOUNTER — TELEPHONE (OUTPATIENT)
Dept: FAMILY MEDICINE | Facility: CLINIC | Age: 32
End: 2017-11-08

## 2017-11-08 NOTE — TELEPHONE ENCOUNTER
Patient is currently taking duexis and is stable.  Please do pa.  She cannot tolerate other nsaids because of her history of gastritis and ulcers.    Also, orders are in and she will get an infusion this week and repeat in 2 weeks. Spoke to ms. Mauro at infusion center.  long

## 2017-11-08 NOTE — TELEPHONE ENCOUNTER
----- Message from Leah Hector sent at 11/8/2017  2:08 PM CST -----  Please enter in a referral and therapy plan for Injectafer scheduled  11/10/17.      Thanks

## 2017-11-09 NOTE — TELEPHONE ENCOUNTER
----- Message from Wilmer Davidson sent at 2017 11:42 AM CST -----  Contact: Patient  Krystal Wren  MRN: [id number]  : 1985  PCP: Funmilayo Gutierrez  Home Phone      [phone]  Work Phone      Not on file.  Mobile          [phone]      MESSAGE: requesting to speak with Jose Miguel -- still no word from DME on [employer]-Pap machine -- please advise    Call [phone]    PCP: Matt

## 2017-11-10 ENCOUNTER — INFUSION (OUTPATIENT)
Dept: INFUSION THERAPY | Facility: HOSPITAL | Age: 32
End: 2017-11-10
Attending: FAMILY MEDICINE
Payer: COMMERCIAL

## 2017-11-10 VITALS
HEIGHT: 64 IN | BODY MASS INDEX: 31.84 KG/M2 | SYSTOLIC BLOOD PRESSURE: 136 MMHG | WEIGHT: 186.5 LBS | TEMPERATURE: 98 F | DIASTOLIC BLOOD PRESSURE: 95 MMHG | RESPIRATION RATE: 18 BRPM | HEART RATE: 100 BPM

## 2017-11-10 DIAGNOSIS — D64.9 ANEMIA, UNSPECIFIED TYPE: Primary | ICD-10-CM

## 2017-11-10 PROCEDURE — 25000003 PHARM REV CODE 250: Performed by: FAMILY MEDICINE

## 2017-11-10 PROCEDURE — 63600175 PHARM REV CODE 636 W HCPCS: Performed by: FAMILY MEDICINE

## 2017-11-10 PROCEDURE — 96365 THER/PROPH/DIAG IV INF INIT: CPT

## 2017-11-10 RX ADMIN — FERRIC CARBOXYMALTOSE INJECTION 750 MG: 50 INJECTION, SOLUTION INTRAVENOUS at 10:11

## 2017-11-10 NOTE — PATIENT INSTRUCTIONS
Ferric carboxymaltose (Injectafer)  What is this medicine?  FERRIC CARBOXYMALTOSE (ferr-ik car-box-ee-mol-toes) is an iron complex. Iron is used to make healthy red blood cells, which carry oxygen and nutrients throughout the body. This medicine is used to treat anemia in people with chronic kidney disease or people who cannot take iron by mouth.  How should I use this medicine?  This medicine is for infusion into a vein. It is given by a health care professional in a hospital or clinic setting.  Talk to your pediatrician regarding the use of this medicine in children. Special care may be needed.  What side effects may I notice from receiving this medicine?  Side effects that you should report to your doctor or health care professional as soon as possible:  · allergic reactions like skin rash, itching or hives, swelling of the face, lips, or tongue -breathing problems  · changes in blood pressure  · feeling faint or lightheaded, falls  · flushing, sweating, or hot feelings  Side effects that usually do not require medical attention (Report these to your doctor or health care professional if they continue or are bothersome.):  · changes in taste  · constipation  · dizziness  · headache  · nausea  · pain, redness, or irritation at site where injected  · vomiting  What may interact with this medicine?  Do not take this medicine with any of the following medications:  · deferoxamine  · dimercaprol  · other iron products  This medicine may also interact with the following medications:  · chloramphenicol  · deferasirox  What if I miss a dose?  It is important not to miss your dose. Call your doctor or health care professional if you are unable to keep an appointment.  Where should I keep my medicine?  This drug is given in a hospital or clinic and will not be stored at home.  What should I tell my health care provider before I take this medicine?  They need to know if you have any of these conditions:  · anemia not  caused by low iron levels  · high levels of iron in the blood  · liver disease  · an unusual or allergic reaction to iron, other medicines, foods, dyes, or preservatives  · pregnant or trying to get pregnant  · breast-feeding  What should I watch for while using this medicine?  Visit your doctor or health care professional regularly. Tell your doctor if your symptoms do not start to get better or if they get worse. You may need blood work done while you are taking this medicine.  You may need to follow a special diet. Talk to your doctor. Foods that contain iron include: whole grains/cereals, dried fruits, beans, or peas, leafy green vegetables, and organ meats (liver, kidney).  NOTE:This sheet is a summary. It may not cover all possible information. If you have questions about this medicine, talk to your doctor, pharmacist, or health care provider. Copyright© 2017 [employer]

## 2017-11-13 ENCOUNTER — TELEPHONE (OUTPATIENT)
Dept: FAMILY MEDICINE | Facility: CLINIC | Age: 32
End: 2017-11-13

## 2017-11-14 NOTE — TELEPHONE ENCOUNTER
Infusion to be completed and repeated in 2 weeks.  Discussed plan and treatment with Zunilda at infusion center last week.  long

## 2017-11-14 NOTE — TELEPHONE ENCOUNTER
----- Message from Leah Hector sent at 11/13/2017  4:10 PM CST -----  Please enter in a referral and therapy plan for Injectafer scheduled 11/27/17.

## 2017-11-15 DIAGNOSIS — D64.9 ANEMIA, UNSPECIFIED TYPE: Primary | ICD-10-CM

## 2017-11-18 ENCOUNTER — OFFICE VISIT (OUTPATIENT)
Dept: URGENT CARE | Facility: CLINIC | Age: 32
End: 2017-11-18
Payer: COMMERCIAL

## 2017-11-18 VITALS
OXYGEN SATURATION: 98 % | WEIGHT: 195 LBS | HEART RATE: 100 BPM | BODY MASS INDEX: 33.29 KG/M2 | DIASTOLIC BLOOD PRESSURE: 97 MMHG | HEIGHT: 64 IN | RESPIRATION RATE: 18 BRPM | SYSTOLIC BLOOD PRESSURE: 134 MMHG | TEMPERATURE: 98 F

## 2017-11-18 DIAGNOSIS — J20.9 ACUTE BRONCHITIS, UNSPECIFIED ORGANISM: Primary | ICD-10-CM

## 2017-11-18 PROCEDURE — 99214 OFFICE O/P EST MOD 30 MIN: CPT | Mod: 25,S$GLB,, | Performed by: PHYSICIAN ASSISTANT

## 2017-11-18 PROCEDURE — 96372 THER/PROPH/DIAG INJ SC/IM: CPT | Mod: S$GLB,,, | Performed by: EMERGENCY MEDICINE

## 2017-11-18 RX ORDER — AZITHROMYCIN 250 MG/1
TABLET, FILM COATED ORAL
Qty: 6 TABLET | Refills: 0 | Status: SHIPPED | OUTPATIENT
Start: 2017-11-18 | End: 2018-02-20 | Stop reason: ALTCHOICE

## 2017-11-18 RX ORDER — BETAMETHASONE SODIUM PHOSPHATE AND BETAMETHASONE ACETATE 3; 3 MG/ML; MG/ML
9 INJECTION, SUSPENSION INTRA-ARTICULAR; INTRALESIONAL; INTRAMUSCULAR; SOFT TISSUE
Status: COMPLETED | OUTPATIENT
Start: 2017-11-18 | End: 2017-11-18

## 2017-11-18 RX ORDER — CHLORZOXAZONE 500 MG/1
500 TABLET ORAL 4 TIMES DAILY PRN
COMMUNITY
End: 2018-04-02 | Stop reason: SDUPTHER

## 2017-11-18 RX ORDER — BENZONATATE 100 MG/1
200 CAPSULE ORAL 3 TIMES DAILY PRN
Qty: 40 CAPSULE | Refills: 0 | Status: SHIPPED | OUTPATIENT
Start: 2017-11-18 | End: 2018-02-20 | Stop reason: ALTCHOICE

## 2017-11-18 RX ORDER — METHYLPREDNISOLONE 4 MG/1
4 TABLET ORAL
Qty: 1 PACKAGE | Refills: 0 | Status: SHIPPED | OUTPATIENT
Start: 2017-11-18 | End: 2017-11-24

## 2017-11-18 RX ORDER — CODEINE PHOSPHATE AND GUAIFENESIN 10; 100 MG/5ML; MG/5ML
5 SOLUTION ORAL 3 TIMES DAILY PRN
Qty: 120 ML | Refills: 0 | Status: SHIPPED | OUTPATIENT
Start: 2017-11-18 | End: 2017-11-28

## 2017-11-18 RX ADMIN — BETAMETHASONE SODIUM PHOSPHATE AND BETAMETHASONE ACETATE 9 MG: 3; 3 INJECTION, SUSPENSION INTRA-ARTICULAR; INTRALESIONAL; INTRAMUSCULAR; SOFT TISSUE at 04:11

## 2017-11-18 NOTE — PROGRESS NOTES
"Subjective:       Patient ID: Krystal Wren is a 31 y.o. female.    Vitals:  height is 5' 4" (1.626 m) and weight is 88.5 kg (195 lb). Her temperature is 98.4 °F (36.9 °C). Her blood pressure is 134/97 (abnormal) and her pulse is 100. Her respiration is 18 and oxygen saturation is 98%.     Chief Complaint: Cough    This is a 31 y.o. female with Past Medical History:  2000: Fibromyalgia  No date: Hyperlipidemia  No date: Hypertension  No date: Irritable bowel syndrome  No date: Migraine   who presents today with a chief complaint of a cough for a week.         Cough   This is a new problem. The current episode started in the past 7 days. The problem has been unchanged. The problem occurs every few minutes. The cough is productive of sputum. Associated symptoms include ear pain and nasal congestion. Pertinent negatives include no chest pain, chills, eye redness, fever, headaches, myalgias, sore throat, shortness of breath or wheezing. Nothing aggravates the symptoms. She has tried prescription cough suppressant, steroid inhaler and OTC cough suppressant for the symptoms. The treatment provided mild relief. Her past medical history is significant for COPD.     Review of Systems   Constitution: Negative for chills, fever and malaise/fatigue.   HENT: Positive for congestion and ear pain. Negative for hoarse voice and sore throat.    Eyes: Negative for discharge and redness.   Cardiovascular: Negative for chest pain, dyspnea on exertion and leg swelling.   Respiratory: Positive for cough and sputum production. Negative for shortness of breath and wheezing.    Musculoskeletal: Negative for myalgias.   Gastrointestinal: Negative for abdominal pain and nausea.   Neurological: Negative for headaches.       Objective:      Physical Exam   Constitutional: She is oriented to person, place, and time. She appears well-developed and well-nourished. She is cooperative.  Non-toxic appearance. She does not appear ill. No " distress.   HENT:   Head: Normocephalic and atraumatic.   Right Ear: Hearing, tympanic membrane, external ear and ear canal normal.   Left Ear: Hearing, tympanic membrane, external ear and ear canal normal.   Nose: Nose normal. No mucosal edema, rhinorrhea or nasal deformity. No epistaxis. Right sinus exhibits no maxillary sinus tenderness and no frontal sinus tenderness. Left sinus exhibits no maxillary sinus tenderness and no frontal sinus tenderness.   Mouth/Throat: Uvula is midline, oropharynx is clear and moist and mucous membranes are normal. No trismus in the jaw. Normal dentition. No uvula swelling. No posterior oropharyngeal erythema.   Eyes: Conjunctivae and lids are normal. No scleral icterus.   Sclera clear bilat   Neck: Trachea normal, full passive range of motion without pain and phonation normal. Neck supple.   Cardiovascular: Normal rate, regular rhythm, normal heart sounds, intact distal pulses and normal pulses.    Pulmonary/Chest: Effort normal. No respiratory distress. She has no decreased breath sounds. She has wheezes. She has no rhonchi. She has no rales.   Upper airway congestion with mild diffuse exp wheezes and occasional nonproductive deep cough   Abdominal: Soft. Normal appearance and bowel sounds are normal. She exhibits no distension. There is no tenderness.   Musculoskeletal: Normal range of motion. She exhibits no edema or deformity.   Neurological: She is alert and oriented to person, place, and time. She exhibits normal muscle tone. Coordination normal.   Skin: Skin is warm, dry and intact. She is not diaphoretic. No pallor.   Psychiatric: She has a normal mood and affect. Her speech is normal and behavior is normal. Judgment and thought content normal. Cognition and memory are normal.   Nursing note and vitals reviewed.      Assessment:       1. Acute bronchitis, unspecified organism        Plan:         Acute bronchitis, unspecified organism  -     betamethasone  acetate-betamethasone sodium phosphate injection 9 mg; Inject 1.5 mLs (9 mg total) into the muscle one time.  -     azithromycin (ZITHROMAX Z-WINSOME) 250 MG tablet; Take 2 tablets (500 mg) on  Day 1,  followed by 1 tablet (250 mg) once daily on Days 2 through 5.  Dispense: 6 tablet; Refill: 0  -     methylPREDNISolone (MEDROL DOSEPACK) 4 mg tablet; Take 1 tablet (4 mg total) by mouth as needed (Take as directed). Take as directed  Dispense: 1 Package; Refill: 0  -     guaifenesin-codeine 100-10 mg/5 ml (TUSSI-ORGANIDIN NR)  mg/5 mL syrup; Take 5 mLs by mouth 3 (three) times daily as needed for Cough.  Dispense: 120 mL; Refill: 0  -     benzonatate (TESSALON PERLES) 100 MG capsule; Take 2 capsules (200 mg total) by mouth 3 (three) times daily as needed for Cough.  Dispense: 40 capsule; Refill: 0        Bronchitis with Wheezing (Viral or Bacterial: Adult)    Bronchitis is an infection of the air passages. It often occurs during a cold and is usually caused by a virus. Symptoms include cough with mucus (phlegm) and low-grade fever. This illness is contagious during the first few days and is spread through the air by coughing and sneezing, or by direct contact (touching the sick person and then touching your own eyes, nose, or mouth).  If there is a lot of inflammation, air flow is restricted. The air passages may also go into spasm, especially if you have asthma. This causes wheezing and difficulty breathing even in people who do not have asthma.  Bronchitis usually lasts 7 to 14 days. The wheezing should improve with treatment during the first week. An inhaler is often prescribed to relax the air passages and stop wheezing. Antibiotics will be prescribed if your doctor thinks there is also a secondary bacterial infection.  Home care  · If symptoms are severe, rest at home for the first 2 to 3 days. When you go back to your usual activities, don't let yourself get too tired.  · Do not smoke. Also avoid being exposed  to secondhand smoke.  · You may use over-the-counter medicine to control fever or pain, unless another medicine was prescribed. Note: If you have chronic liver or kidney disease or have ever had a stomach ulcer or gastrointestinal bleeding, talk with your healthcare provider before using these medicines. Also talk to your provider if you are taking medicine to prevent blood clots.) Aspirin should never be given to anyone younger than 18 years of age who is ill with a viral infection or fever. It may cause severe liver or brain damage.  · Your appetite may be poor, so a light diet is fine. Avoid dehydration by drinking 6 to 8 glasses of fluids per day (such as water, soft drinks, sports drinks, juices, tea, or soup). Extra fluids will help loosen secretions in the nose and lungs.  · Over-the-counter cough, cold, and sore-throat medicines will not shorten the length of the illness, but they may be helpful to reduce symptoms. (Note: Do not use decongestants if you have high blood pressure.)  · If you were given an inhaler, use it exactly as directed. If you need to use it more often than prescribed, your condition may be worsening. If this happens, contact your healthcare provider.  · If prescribed, finish all antibiotic medicine, even if you are feeling better after only a few days.  Follow-up care  Follow up with your healthcare provider, or as advised. If you had an X-ray or ECG (electrocardiogram), a specialist will review it. You will be notified of any new findings that may affect your care.  Note: If you are age 65 or older, or if you have a chronic lung disease or condition that affects your immune system, or you smoke, talk to your healthcare provider about having a pneumococcal vaccinations and a yearly influenza vaccination (flu shot).  When to seek medical advice  Call your healthcare provider right away if any of these occur:  · Fever of 100.4°F (38°C) or higher  · Coughing up increasing amounts of colored  sputum  · Weakness, drowsiness, headache, facial pain, ear pain, or a stiff neck  Call 911, or get immediate medical care  Contact emergency services right away if any of these occur.  · Coughing up blood  · Worsening weakness, drowsiness, headache, or stiff neck  · Increased wheezing not helped with medication, shortness of breath, or pain with breathing  Date Last Reviewed: 9/13/2015  © 6750-6937 [employer]. [address] [place] [address], [place] [address]. All rights reserved. This information is not intended as a substitute for professional medical care. Always follow your healthcare professional's instructions.      Please follow up with your Primary care provider within 2-5 days if your signs and symptoms have not resolved or worsen.     If your condition worsens or fails to improve we recommend that you receive another evaluation at the emergency room immediately or contact your primary medical clinic to discuss your concerns.   You must understand that you have received an Urgent Care treatment only and that you may be released before all of your medical problems are known or treated. You, the patient, will arrange for follow up care as instructed.

## 2017-11-18 NOTE — PATIENT INSTRUCTIONS
Bronchitis with Wheezing (Viral or Bacterial: Adult)    Bronchitis is an infection of the air passages. It often occurs during a cold and is usually caused by a virus. Symptoms include cough with mucus (phlegm) and low-grade fever. This illness is contagious during the first few days and is spread through the air by coughing and sneezing, or by direct contact (touching the sick person and then touching your own eyes, nose, or mouth).  If there is a lot of inflammation, air flow is restricted. The air passages may also go into spasm, especially if you have asthma. This causes wheezing and difficulty breathing even in people who do not have asthma.  Bronchitis usually lasts 7 to 14 days. The wheezing should improve with treatment during the first week. An inhaler is often prescribed to relax the air passages and stop wheezing. Antibiotics will be prescribed if your doctor thinks there is also a secondary bacterial infection.  Home care  · If symptoms are severe, rest at home for the first 2 to 3 days. When you go back to your usual activities, don't let yourself get too tired.  · Do not smoke. Also avoid being exposed to secondhand smoke.  · You may use over-the-counter medicine to control fever or pain, unless another medicine was prescribed. Note: If you have chronic liver or kidney disease or have ever had a stomach ulcer or gastrointestinal bleeding, talk with your healthcare provider before using these medicines. Also talk to your provider if you are taking medicine to prevent blood clots.) Aspirin should never be given to anyone younger than 18 years of age who is ill with a viral infection or fever. It may cause severe liver or brain damage.  · Your appetite may be poor, so a light diet is fine. Avoid dehydration by drinking 6 to 8 glasses of fluids per day (such as water, soft drinks, sports drinks, juices, tea, or soup). Extra fluids will help loosen secretions in the nose and lungs.  · Over-the-counter  cough, cold, and sore-throat medicines will not shorten the length of the illness, but they may be helpful to reduce symptoms. (Note: Do not use decongestants if you have high blood pressure.)  · If you were given an inhaler, use it exactly as directed. If you need to use it more often than prescribed, your condition may be worsening. If this happens, contact your healthcare provider.  · If prescribed, finish all antibiotic medicine, even if you are feeling better after only a few days.  Follow-up care  Follow up with your healthcare provider, or as advised. If you had an X-ray or ECG (electrocardiogram), a specialist will review it. You will be notified of any new findings that may affect your care.  Note: If you are age 65 or older, or if you have a chronic lung disease or condition that affects your immune system, or you smoke, talk to your healthcare provider about having a pneumococcal vaccinations and a yearly influenza vaccination (flu shot).  When to seek medical advice  Call your healthcare provider right away if any of these occur:  · Fever of 100.4°F (38°C) or higher  · Coughing up increasing amounts of colored sputum  · Weakness, drowsiness, headache, facial pain, ear pain, or a stiff neck  Call 911, or get immediate medical care  Contact emergency services right away if any of these occur.  · Coughing up blood  · Worsening weakness, drowsiness, headache, or stiff neck  · Increased wheezing not helped with medication, shortness of breath, or pain with breathing  Date Last Reviewed: 9/13/2015  © 5275-1790 [employer]. [address] [place] [address], [place] [address]. All rights reserved. This information is not intended as a substitute for professional medical care. Always follow your healthcare professional's instructions.      Please follow up with your Primary care provider within 2-5 days if your signs and symptoms have not resolved or worsen.     If your condition worsens or fails to improve  we recommend that you receive another evaluation at the emergency room immediately or contact your primary medical clinic to discuss your concerns.   You must understand that you have received an Urgent Care treatment only and that you may be released before all of your medical problems are known or treated. You, the patient, will arrange for follow up care as instructed.

## 2017-11-21 ENCOUNTER — TELEPHONE (OUTPATIENT)
Dept: URGENT CARE | Facility: CLINIC | Age: 32
End: 2017-11-21

## 2017-11-28 ENCOUNTER — PATIENT MESSAGE (OUTPATIENT)
Dept: FAMILY MEDICINE | Facility: CLINIC | Age: 32
End: 2017-11-28

## 2017-12-01 ENCOUNTER — INFUSION (OUTPATIENT)
Dept: INFUSION THERAPY | Facility: HOSPITAL | Age: 32
End: 2017-12-01
Attending: FAMILY MEDICINE
Payer: COMMERCIAL

## 2017-12-01 VITALS
TEMPERATURE: 99 F | SYSTOLIC BLOOD PRESSURE: 157 MMHG | HEART RATE: 110 BPM | RESPIRATION RATE: 18 BRPM | DIASTOLIC BLOOD PRESSURE: 99 MMHG

## 2017-12-01 DIAGNOSIS — D64.9 ANEMIA, UNSPECIFIED TYPE: Primary | ICD-10-CM

## 2017-12-01 PROCEDURE — 96365 THER/PROPH/DIAG IV INF INIT: CPT

## 2017-12-01 PROCEDURE — 25000003 PHARM REV CODE 250: Performed by: FAMILY MEDICINE

## 2017-12-01 PROCEDURE — 63600175 PHARM REV CODE 636 W HCPCS: Performed by: FAMILY MEDICINE

## 2017-12-01 RX ADMIN — FERRIC CARBOXYMALTOSE INJECTION 750 MG: 50 INJECTION, SOLUTION INTRAVENOUS at 11:12

## 2017-12-01 NOTE — PLAN OF CARE
Problem: Health Knowledge, Opportunity to Enhance (Adult,NICU,[place],Obstetrics,Pediatric)  Goal: Knowledgeable about Health Subject/Topic  Patient will demonstrate the desired outcomes by discharge/transition of care.  Outcome: Outcome(s) achieved Date Met: 17 1206   Health Knowledge, Opportunity to Enhance (Adult,NICU,[place],Obstetrics,Pediatric)   Knowledgeable about Health Subject/Topic achieves outcome

## 2017-12-01 NOTE — PLAN OF CARE
Problem: Patient Care Overview  Goal: Discharge Needs Assessment  Outcome: Outcome(s) achieved Date Met: 12/01/17 12/01/17 1203   Activity/Self Care ROS   Equipment Currently Used at Home none   Living Environment   Transportation Available car   Social Work Plan   Patient/Family In Agreement With Plan yes

## 2017-12-01 NOTE — PLAN OF CARE
Problem: Patient Care Overview  Goal: Individualization & Mutuality   12/01/17 1207   Individualization   Patient Specific Preferences none   Mutuality/Individual Preferences   What Anxieties, Fears, Concerns, or Questions Do You Have About Your Care? none   Mutuality   What Questions Do You Have About Your Health or Care? none

## 2017-12-12 ENCOUNTER — PATIENT MESSAGE (OUTPATIENT)
Dept: FAMILY MEDICINE | Facility: CLINIC | Age: 32
End: 2017-12-12

## 2017-12-12 DIAGNOSIS — D50.0 IRON DEFICIENCY ANEMIA DUE TO CHRONIC BLOOD LOSS: Primary | ICD-10-CM

## 2017-12-28 ENCOUNTER — PATIENT MESSAGE (OUTPATIENT)
Dept: FAMILY MEDICINE | Facility: CLINIC | Age: 32
End: 2017-12-28

## 2017-12-30 RX ORDER — MODAFINIL 100 MG/1
TABLET ORAL
Qty: 60 TABLET | Refills: 0 | Status: SHIPPED | OUTPATIENT
Start: 2017-12-30 | End: 2018-01-30 | Stop reason: SDUPTHER

## 2018-01-30 RX ORDER — MODAFINIL 100 MG/1
TABLET ORAL
Qty: 60 TABLET | Refills: 0 | Status: SHIPPED | OUTPATIENT
Start: 2018-01-30 | End: 2018-03-12 | Stop reason: SDUPTHER

## 2018-02-02 NOTE — TELEPHONE ENCOUNTER
----- Message from Marianne Blanchard sent at 2018 12:07 PM CST -----  Contact: Patient  Krystal Wren  MRN: [id number]  : 1985  PCP: Funmilayo Gutierrez  Home Phone      [phone]  Work Phone      Not on file.  Mobile          [phone]      MESSAGE: Requesting prescription that is typically prescribed by specialist (Pantoprazole 40mg twice a day) - originally given by GI doc - states that insurance is requesting PA - patient doesn't have a need to see GI again, but would need that appt if PA were to be requested. [facility] - Patient phone: [phone]

## 2018-02-06 RX ORDER — METHOCARBAMOL 500 MG/1
TABLET, FILM COATED ORAL
Qty: 60 TABLET | Refills: 0 | Status: SHIPPED | OUTPATIENT
Start: 2018-02-06 | End: 2018-04-02 | Stop reason: SDUPTHER

## 2018-02-06 RX ORDER — PANTOPRAZOLE SODIUM 40 MG/1
40 TABLET, DELAYED RELEASE ORAL DAILY
Qty: 30 TABLET | Refills: 5 | Status: SHIPPED | OUTPATIENT
Start: 2018-02-06 | End: 2018-02-20 | Stop reason: SDUPTHER

## 2018-02-06 NOTE — TELEPHONE ENCOUNTER
Please call her.  Her script says BID but typically this is only 1 month after scope and then she should have resumed daily only.   Also, please fill out PA as she has h/o PUD and gastritis and needs protonix.  Also please stress the avoidance of nsaids.  mh

## 2018-02-19 ENCOUNTER — NURSE TRIAGE (OUTPATIENT)
Dept: ADMINISTRATIVE | Facility: CLINIC | Age: 33
End: 2018-02-19

## 2018-02-19 NOTE — TELEPHONE ENCOUNTER
Reason for Disposition   MODERATE neck pain (e.g., interferes with normal activities like work or school)    Protocols used: ST NECK PAIN OR [id number]    Pt c/o R neck and R jaw pain x 3 days.  Pt unsure if she needs to see MD or dentist.  Pt unsure if she has a tooth issue. Pt advised per protocol and verbalizes understanding.

## 2018-02-20 ENCOUNTER — OFFICE VISIT (OUTPATIENT)
Dept: INTERNAL MEDICINE | Facility: CLINIC | Age: 33
End: 2018-02-20
Payer: COMMERCIAL

## 2018-02-20 VITALS
OXYGEN SATURATION: 98 % | WEIGHT: 198.63 LBS | HEART RATE: 104 BPM | BODY MASS INDEX: 34.1 KG/M2 | DIASTOLIC BLOOD PRESSURE: 90 MMHG | SYSTOLIC BLOOD PRESSURE: 130 MMHG

## 2018-02-20 DIAGNOSIS — M54.2 NECK PAIN ON RIGHT SIDE: Primary | ICD-10-CM

## 2018-02-20 DIAGNOSIS — D50.9 IRON DEFICIENCY ANEMIA, UNSPECIFIED IRON DEFICIENCY ANEMIA TYPE: ICD-10-CM

## 2018-02-20 DIAGNOSIS — K21.9 GASTROESOPHAGEAL REFLUX DISEASE, ESOPHAGITIS PRESENCE NOT SPECIFIED: ICD-10-CM

## 2018-02-20 PROCEDURE — 99999 PR PBB SHADOW E&M-EST. PATIENT-LVL IV: CPT | Mod: PBBFAC,,, | Performed by: INTERNAL MEDICINE

## 2018-02-20 PROCEDURE — 3008F BODY MASS INDEX DOCD: CPT | Mod: S$GLB,,, | Performed by: INTERNAL MEDICINE

## 2018-02-20 PROCEDURE — 99213 OFFICE O/P EST LOW 20 MIN: CPT | Mod: S$GLB,,, | Performed by: INTERNAL MEDICINE

## 2018-02-20 RX ORDER — PANTOPRAZOLE SODIUM 40 MG/1
40 TABLET, DELAYED RELEASE ORAL DAILY
Qty: 30 TABLET | Refills: 5 | Status: SHIPPED | OUTPATIENT
Start: 2018-02-20 | End: 2018-11-21 | Stop reason: SDUPTHER

## 2018-02-20 RX ORDER — IBUPROFEN 800 MG/1
800 TABLET ORAL EVERY 6 HOURS PRN
COMMUNITY
End: 2018-11-21 | Stop reason: SDUPTHER

## 2018-02-20 NOTE — PROGRESS NOTES
Subjective:      Patient ID: Krystal Wren is a 32 y.o. female.    Chief Complaint: Neck Pain     Urgent visit.    HPI: 32 y.o. White female awakened 5 days ago with a pain in her right neck.  No trauma, sudden motion.  No previous URI,earache, sore throat.  Always has headaches, but has the usual HA the day before she got a neck pain.  She carries a litany of diagnoses, including fibromyalgia since she was 14.    Apparently has tried all the usual meds: neurontin,lyrica,savella,amitryptaline,  Nortriptaline, hydrocodone,fentanyl patches, facet injections, biofeedback, accupuncture, /bootcamp.,  NSAI.  Then was found to be both anemic and vit d def...  Was on IV iron.  Seen today as an emergency.  She saw a dentist yesterday, who told her, she has TMJ.      Review of Systems   Constitutional: Negative.    HENT: Negative for congestion, dental problem, ear discharge, ear pain, mouth sores, postnasal drip, rhinorrhea and sore throat.    Eyes: Negative.    Respiratory: Negative.    Cardiovascular: Negative.    Gastrointestinal: Negative.    Endocrine: Negative.    Genitourinary: Negative.    Musculoskeletal: Positive for myalgias and neck pain.   Skin: Negative.    Allergic/Immunologic: Negative.    Neurological: Negative.    Hematological: Negative.    Psychiatric/Behavioral: Negative.        Objective:   BP (!) 130/90 (BP Location: Left arm, Patient Position: Sitting, BP Method: Large (Manual))   Pulse 104   Wt 90.1 kg (198 lb 10.2 oz)   SpO2 98%   BMI 34.10 kg/m²     Physical Exam   Constitutional: She appears well-developed and well-nourished.   HENT:   Head: Normocephalic and atraumatic.   Right Ear: External ear normal.   Left Ear: External ear normal.   Nose: Nose normal.   Mouth/Throat: Oropharynx is clear and moist.   Eyes: Conjunctivae are normal. Pupils are equal, round, and reactive to light.   Neck: Normal range of motion. Neck supple. No JVD present. No tracheal deviation present. No  thyromegaly present.   Cardiovascular: Normal rate, regular rhythm and normal heart sounds.    Pulmonary/Chest: Effort normal and breath sounds normal. No stridor.   Musculoskeletal: She exhibits no edema or deformity.   Lymphadenopathy:     She has no cervical adenopathy.   Neurological: She is alert.   Skin: Skin is warm and dry.   Psychiatric: She has a normal mood and affect. Her behavior is normal.   Nursing note and vitals reviewed.      Assessment:     1. Neck pain on right side    2. Gastroesophageal reflux disease, esophagitis presence not specified    3. Iron deficiency anemia, unspecified iron deficiency anemia type    Inquiring about iron infusion, which is not done here.  Plan:     Neck pain on right side  Pt has a litany of meds she can take, but heat/cool, massage would be the best.  Gastroesophageal reflux disease, esophagitis presence not specified  -     pantoprazole (PROTONIX) 40 MG tablet; Take 1 tablet (40 mg total) by mouth once daily.  Dispense: 30 tablet; Refill: 5    Iron deficiency anemia, unspecified iron deficiency anemia type  -     Ambulatory referral to Hematology / Oncology    Refer back to her PCP.

## 2018-02-22 ENCOUNTER — TELEPHONE (OUTPATIENT)
Dept: HEMATOLOGY/ONCOLOGY | Facility: CLINIC | Age: 33
End: 2018-02-22

## 2018-02-28 LAB
HPV, HIGH-RISK: NOT DETECTED
PAP SMEAR: NORMAL

## 2018-03-01 ENCOUNTER — PATIENT MESSAGE (OUTPATIENT)
Dept: INTERNAL MEDICINE | Facility: CLINIC | Age: 33
End: 2018-03-01

## 2018-03-13 RX ORDER — MODAFINIL 100 MG/1
TABLET ORAL
Qty: 60 TABLET | Refills: 0 | Status: SHIPPED | OUTPATIENT
Start: 2018-03-13 | End: 2018-04-02 | Stop reason: SDUPTHER

## 2018-03-14 ENCOUNTER — INITIAL CONSULT (OUTPATIENT)
Dept: HEMATOLOGY/ONCOLOGY | Facility: CLINIC | Age: 33
End: 2018-03-14
Payer: COMMERCIAL

## 2018-03-14 ENCOUNTER — LAB VISIT (OUTPATIENT)
Dept: LAB | Facility: HOSPITAL | Age: 33
End: 2018-03-14
Payer: COMMERCIAL

## 2018-03-14 VITALS
TEMPERATURE: 99 F | BODY MASS INDEX: 33.8 KG/M2 | WEIGHT: 198 LBS | RESPIRATION RATE: 16 BRPM | SYSTOLIC BLOOD PRESSURE: 140 MMHG | HEIGHT: 64 IN | HEART RATE: 70 BPM | OXYGEN SATURATION: 100 % | DIASTOLIC BLOOD PRESSURE: 104 MMHG

## 2018-03-14 DIAGNOSIS — D50.0 IRON DEFICIENCY ANEMIA DUE TO CHRONIC BLOOD LOSS: ICD-10-CM

## 2018-03-14 DIAGNOSIS — D50.0 IRON DEFICIENCY ANEMIA DUE TO CHRONIC BLOOD LOSS: Primary | ICD-10-CM

## 2018-03-14 LAB
BASOPHILS # BLD AUTO: 0.05 K/UL
BASOPHILS NFR BLD: 0.5 %
DIFFERENTIAL METHOD: ABNORMAL
EOSINOPHIL # BLD AUTO: 0.2 K/UL
EOSINOPHIL NFR BLD: 2.2 %
ERYTHROCYTE [DISTWIDTH] IN BLOOD BY AUTOMATED COUNT: 12.2 %
FERRITIN SERPL-MCNC: 233 NG/ML
HCT VFR BLD AUTO: 41.4 %
HGB BLD-MCNC: 13.6 G/DL
IMM GRANULOCYTES # BLD AUTO: 0.05 K/UL
IMM GRANULOCYTES NFR BLD AUTO: 0.5 %
LYMPHOCYTES # BLD AUTO: 2.3 K/UL
LYMPHOCYTES NFR BLD: 25.2 %
MCH RBC QN AUTO: 29.2 PG
MCHC RBC AUTO-ENTMCNC: 32.9 G/DL
MCV RBC AUTO: 89 FL
MONOCYTES # BLD AUTO: 0.5 K/UL
MONOCYTES NFR BLD: 5.3 %
NEUTROPHILS # BLD AUTO: 6 K/UL
NEUTROPHILS NFR BLD: 66.3 %
NRBC BLD-RTO: 0 /100 WBC
PLATELET # BLD AUTO: 228 K/UL
PMV BLD AUTO: 10.5 FL
RBC # BLD AUTO: 4.65 M/UL
WBC # BLD AUTO: 9.11 K/UL

## 2018-03-14 PROCEDURE — 82728 ASSAY OF FERRITIN: CPT

## 2018-03-14 PROCEDURE — 99204 OFFICE O/P NEW MOD 45 MIN: CPT | Mod: S$GLB,,, | Performed by: INTERNAL MEDICINE

## 2018-03-14 PROCEDURE — 99999 PR PBB SHADOW E&M-EST. PATIENT-LVL III: CPT | Mod: PBBFAC,,, | Performed by: INTERNAL MEDICINE

## 2018-03-14 PROCEDURE — 36415 COLL VENOUS BLD VENIPUNCTURE: CPT

## 2018-03-14 PROCEDURE — 85025 COMPLETE CBC W/AUTO DIFF WBC: CPT

## 2018-03-14 RX ORDER — CYCLOBENZAPRINE HCL 10 MG
TABLET ORAL
Refills: 0 | COMMUNITY
Start: 2018-02-20 | End: 2018-03-14 | Stop reason: SDUPTHER

## 2018-03-14 NOTE — LETTER
March 15, 2018      Jose Manuel MD  [address] [facility]  [address]  [place] [address]           [facility]  [address] [facility] [address]  Phone: [phone]          Patient: Krystal Wren   MR Number: [id number]   YOB: 1985   Date of Visit: 3/14/2018       Dear Dr. Jose Manuel:    Thank you for referring Krystal Wren to me for evaluation. Attached you will find relevant portions of my assessment and plan of care.    If you have questions, please do not hesitate to call me. I look forward to following Krystal Wrne along with you.    Sincerely,    Debra Don MD    Enclosure  CC:  No Recipients    If you would like to receive this communication electronically, please contact [email or web address] or [phone] to request more information on [employer] access.    For providers and/or their staff who would like to refer a patient to [facility], please contact us through our one-stop-shop provider referral line, [facility], at [phone].    If you feel you have received this communication in error or would no longer like to receive these types of communications, please e-mail [email or web address]

## 2018-03-15 NOTE — PROGRESS NOTES
Subjective:       Patient ID: Krystal Wren is a 32 y.o. female.    Chief Complaint: Anemia    Krystal presents for evaluation of anemia. She has a history of iron deficiency, presumed due to menstrual blood loss. She also has a history of GERD with ulcerative gastritis. She has received oral iron therapy in the past with minimal results. This also causes GI distress and constipation. She last received IV iron therapy in December 2017 with no follow-up CBC or iron studies. She is not vegan and dietary heme iron is normal. Repeat CBC and ferritin are normal on day of our visit.    HPI  Review of Systems   Constitutional: Positive for fatigue.   Gastrointestinal: Positive for abdominal pain and constipation.   Genitourinary: Positive for menstrual problem and vaginal bleeding.   Musculoskeletal: Positive for arthralgias.       Objective:      Physical Exam   Constitutional: She is oriented to person, place, and time. She appears well-developed and well-nourished.   HENT:   Head: Normocephalic and atraumatic.   Eyes: Conjunctivae are normal. No scleral icterus.   Neck: Normal range of motion. Neck supple.   Cardiovascular: Normal rate and intact distal pulses.    Pulmonary/Chest: Effort normal. No respiratory distress.   Abdominal: Soft. She exhibits no distension. There is no tenderness.   Musculoskeletal: Normal range of motion. She exhibits no edema.   Neurological: She is alert and oriented to person, place, and time. No cranial nerve deficit.   Skin: Skin is warm and dry.   Psychiatric: She has a normal mood and affect. Her behavior is normal.   Nursing note and vitals reviewed.      Assessment:       1. Iron deficiency anemia due to chronic blood loss        Plan:        Iron deficiency anemia due to menstrual blood loss. Well supplemented after IV iron therapy in December 2017.  Recommend repeat CBC and ferritin in 4-6 months.

## 2018-03-19 ENCOUNTER — PATIENT MESSAGE (OUTPATIENT)
Dept: HEMATOLOGY/ONCOLOGY | Facility: CLINIC | Age: 33
End: 2018-03-19

## 2018-03-21 ENCOUNTER — OFFICE VISIT (OUTPATIENT)
Dept: ALLERGY | Facility: CLINIC | Age: 33
End: 2018-03-21
Payer: COMMERCIAL

## 2018-03-21 VITALS
DIASTOLIC BLOOD PRESSURE: 98 MMHG | HEIGHT: 64 IN | BODY MASS INDEX: 33.98 KG/M2 | SYSTOLIC BLOOD PRESSURE: 160 MMHG | WEIGHT: 199.06 LBS

## 2018-03-21 DIAGNOSIS — R23.2 FLUSHING: Primary | ICD-10-CM

## 2018-03-21 DIAGNOSIS — Z91.010 H/O PEANUT ALLERGY: ICD-10-CM

## 2018-03-21 PROCEDURE — 99999 PR PBB SHADOW E&M-EST. PATIENT-LVL III: CPT | Mod: PBBFAC,,, | Performed by: ALLERGY & IMMUNOLOGY

## 2018-03-21 PROCEDURE — 99204 OFFICE O/P NEW MOD 45 MIN: CPT | Mod: S$GLB,,, | Performed by: ALLERGY & IMMUNOLOGY

## 2018-03-21 RX ORDER — ESCITALOPRAM OXALATE 5 MG/5ML
10 SOLUTION ORAL DAILY
COMMUNITY
End: 2018-11-21 | Stop reason: SDUPTHER

## 2018-03-21 NOTE — PROGRESS NOTES
"ALLERGY & IMMUNOLOGY CLINIC - INITIAL CONSULTATION      HISTORY OF PRESENT ILLNESS     Patient ID: Krystal Wren is a 32 y.o. female    CC: personal concern for mast cell activation syndrome     HPI: Patient here for concern for mast cell activation. Never been diagnosed.  Suffered with recurrent ear infections and "allergies" during childhood.  At 9 years of age, was seen by physician for rash and was diagnosed with mastocytosis.  Went to another physician for second opinion who said that it was actually poison oak.  May have had 2-3 more similar lesions during childhood but denies any in adulthood.     Main symptom concerns: Has migraines, pain, fatigue. GI issues.    Diagnosed with fibromylagia and IBS.  Disabled for last 5 years.  Has had history of gastritis and ulcers which physicians have attributed to chronic NSAID use for pain.      Says she has flushing (sometimes related to heat but not always) but no true rashes.  No hives.  No recurrent episodes of angiodema.  Has alternating constipation and diarrhea.  Has diarrhea daily, often with urgency.  Denies any syncopal episodes.  No episodes of hypotension.       No severe reactions to insect stings.      Rhinitis: Still has congestion, runny nose.  Per patient, not enough to take medications.  Was on AIT from 10 yo - 13 yo.  Gets mild rashes with grasses.    Food allergy: Had history of positive SPT to peanuts as child.  In past has had chest tightness with peanut oil and peanuts.  No hives.  Last peanut ingestion was within last 3 years.    Asthma: was diagnosed with chronic bronchitis.  Complains of wheeze several times a week.  Uses albuterol with symptoms but only uses it few times a year.  Has been on oral steroids at least twice in last year.    Allergies: Got hives with ceclor when 2 years of ago.  Got calf/ankle edema three days after being on lyrica.  Neurontin - had difficulty finding words, memory loss.  Was suicidal on cymbalta.      " REVIEW OF SYSTEMS     Review of Systems   Constitutional: Positive for malaise/fatigue. Negative for fever and weight loss.   HENT: Negative for congestion and sore throat.    Eyes: Negative for discharge and redness.   Respiratory: Negative for cough, shortness of breath and wheezing.    Cardiovascular: Negative for chest pain.   Gastrointestinal: Positive for constipation and diarrhea. Negative for abdominal pain, nausea and vomiting.   Genitourinary: Negative.    Musculoskeletal: Negative.    Skin: Negative for itching and rash.   Neurological: Negative.  Negative for headaches.   Endo/Heme/Allergies: Negative.    Psychiatric/Behavioral: Negative.         MEDICAL HISTORY   PMH: fibromyalgia, MDD, GERD, CASSY  Meds:   Current Outpatient Prescriptions:     butalbital-acetaminophen-caffeine -40 mg (FIORICET, ESGIC) -40 mg per tablet, TK 1 T PO Q 6 HOURS AS NEEDED FOR HEADACHES, Disp: , Rfl: 0    chlorzoxazone (PARAFON FORTE) 500 mg Tab, Take 500 mg by mouth 4 (four) times daily as needed., Disp: , Rfl:     escitalopram oxalate (LEXAPRO) 5 mg/5 mL Soln, Take 10 mg by mouth once daily., Disp: , Rfl:     ibuprofen (ADVIL,MOTRIN) 800 MG tablet, Take 800 mg by mouth every 6 (six) hours as needed for Pain., Disp: , Rfl:     ibuprofen-famotidine (DUEXIS) 800-26.6 mg Tab, Take 1 tablet by mouth 3 (three) times daily., Disp: 60 tablet, Rfl: 0    methocarbamol (ROBAXIN) 500 MG Tab, TAKE 1 TABLET(500 MG) BY MOUTH TWICE DAILY AS NEEDED, Disp: 60 tablet, Rfl: 0    modafinil (PROVIGIL) 100 MG Tab, TAKE 2 TABLETS(200 MG) BY MOUTH EVERY DAY, Disp: 60 tablet, Rfl: 0    ondansetron (ZOFRAN-ODT) 8 MG TbDL, , Disp: , Rfl: 2    pantoprazole (PROTONIX) 40 MG tablet, Take 1 tablet (40 mg total) by mouth once daily., Disp: 30 tablet, Rfl: 5    promethazine (PHENERGAN) 12.5 MG Tab, Take 1 tablet (12.5 mg total) by mouth 4 (four) times daily as needed., Disp: 15 tablet, Rfl: 0    traMADol (ULTRAM) 50 mg tablet, TK 1 T PO  "Q 8 H PRN, Disp: 45 tablet, Rfl: 0    turmeric root extract 500 mg Cap, Take by mouth., Disp: , Rfl:     fluticasone (FLOVENT HFA) 220 mcg/actuation inhaler, Inhale 1 puff into the lungs 2 (two) times daily. Controller, Disp: 12 g, Rfl: 0  Sh: smoked on and off for 13 years, about 1 ppd.       PHYSICAL EXAM   BP (!) 160/98 (BP Location: Right arm, Patient Position: Sitting)   Ht 5' 4" (1.626 m)   Wt 90.3 kg (199 lb 1.2 oz)   LMP 02/21/2018   BMI 34.17 kg/m²   Physical Exam   Constitutional: She is oriented to person, place, and time and well-developed, well-nourished, and in no distress. No distress.   HENT:   Head: Normocephalic and atraumatic.   Right Ear: External ear normal.   Left Ear: External ear normal.   Nose: Nose normal.   Mouth/Throat: Oropharynx is clear and moist. No oropharyngeal exudate.   Eyes: Conjunctivae are normal. Right eye exhibits no discharge. Left eye exhibits no discharge.   Neck: Normal range of motion. Neck supple.   Cardiovascular: Normal rate, regular rhythm and normal heart sounds.  Exam reveals no friction rub.    Pulmonary/Chest: Effort normal. No respiratory distress. She has no wheezes.   Abdominal: Soft. She exhibits no distension. There is no tenderness.   Musculoskeletal: Normal range of motion. She exhibits no tenderness.   Neurological: She is alert and oriented to person, place, and time. Gait normal.   Skin: Skin is warm. No rash noted.   Psychiatric: Affect normal.        CHART REVIEW     Previous PCP notes and pertinent      ASSESSMENT & PLAN     Krystal Wren is a 32 y.o. female with     Flushing: Mast cell activation syndrome not likely given lack of more classic symptoms (i.e., urticaria, angioedema, hypotension, hymenoptera anaphylaxis, cutaneous symptoms).  While diarrhea may be symptom, given history of alternating constipation with sense of urgency with diarrhea, more likely c/w IBS.  MCAS more often associated with elevated baseline tryptase and not " inflammatory markers. Will order baseline tryptase.    -     Tryptase; Future; Expected date: 03/21/2018    H/O peanut allergy  -     Allergen, Peanut Components IGE; Future; Expected date: 03/21/2018        Follow up: PRN depending lab results.  Will call/email patient with results     nIdiana Diaz MD  Allergy Fellow

## 2018-03-22 ENCOUNTER — TELEPHONE (OUTPATIENT)
Dept: HEMATOLOGY/ONCOLOGY | Facility: CLINIC | Age: 33
End: 2018-03-22

## 2018-03-22 NOTE — TELEPHONE ENCOUNTER
----- Message from Chris Morfin sent at 3/22/2018 12:40 PM CDT -----  Contact: pt   PT called asking for advice about Mast cell activation syndrome Pt believe  She has this disease and would like further information on it   Pt insisted on message being sent to Magno Tellez      Pt can be reached at [phone]  Spoke with pt and explained that Dr. Tellez does not treat mast cell activation, pt verbalized understanding.  Dayra

## 2018-03-23 RX ORDER — DICLOFENAC SODIUM 10 MG/G
GEL TOPICAL
Qty: 300 G | Refills: 0 | Status: SHIPPED | OUTPATIENT
Start: 2018-03-23 | End: 2020-01-10 | Stop reason: SDUPTHER

## 2018-03-28 ENCOUNTER — PATIENT MESSAGE (OUTPATIENT)
Dept: ALLERGY | Facility: CLINIC | Age: 33
End: 2018-03-28

## 2018-03-29 ENCOUNTER — TELEPHONE (OUTPATIENT)
Dept: NEUROLOGY | Facility: CLINIC | Age: 33
End: 2018-03-29

## 2018-03-29 NOTE — TELEPHONE ENCOUNTER
"Spoke to Patient and offered her to come in and see Dr Trejo-she states she will speak to her specialist tomorrow to get an opinion because she has a "complicated"disease.She will call clinic on Monday with an answer.  "

## 2018-04-02 ENCOUNTER — OFFICE VISIT (OUTPATIENT)
Dept: FAMILY MEDICINE | Facility: CLINIC | Age: 33
End: 2018-04-02
Payer: COMMERCIAL

## 2018-04-02 VITALS
TEMPERATURE: 99 F | HEIGHT: 64 IN | SYSTOLIC BLOOD PRESSURE: 150 MMHG | OXYGEN SATURATION: 97 % | BODY MASS INDEX: 33.27 KG/M2 | DIASTOLIC BLOOD PRESSURE: 104 MMHG | WEIGHT: 194.88 LBS | HEART RATE: 116 BPM

## 2018-04-02 DIAGNOSIS — F33.1 MAJOR DEPRESSIVE DISORDER, RECURRENT, MODERATE: ICD-10-CM

## 2018-04-02 DIAGNOSIS — D89.40 MAST CELL ACTIVATION SYNDROME: ICD-10-CM

## 2018-04-02 DIAGNOSIS — R00.0 TACHYCARDIA: ICD-10-CM

## 2018-04-02 DIAGNOSIS — G47.30 SLEEP APNEA, UNSPECIFIED TYPE: ICD-10-CM

## 2018-04-02 DIAGNOSIS — M25.50 ARTHRALGIA, UNSPECIFIED JOINT: ICD-10-CM

## 2018-04-02 DIAGNOSIS — M79.7 FIBROMYALGIA: ICD-10-CM

## 2018-04-02 DIAGNOSIS — Z00.00 ANNUAL PHYSICAL EXAM: Primary | ICD-10-CM

## 2018-04-02 PROCEDURE — 99395 PREV VISIT EST AGE 18-39: CPT | Mod: S$GLB,,, | Performed by: FAMILY MEDICINE

## 2018-04-02 PROCEDURE — 99999 PR PBB SHADOW E&M-EST. PATIENT-LVL III: CPT | Mod: PBBFAC,,, | Performed by: FAMILY MEDICINE

## 2018-04-02 RX ORDER — METHOCARBAMOL 500 MG/1
TABLET, FILM COATED ORAL
Qty: 60 TABLET | Refills: 11 | Status: SHIPPED | OUTPATIENT
Start: 2018-04-02 | End: 2018-11-21

## 2018-04-02 RX ORDER — TRAMADOL HYDROCHLORIDE 50 MG/1
TABLET ORAL
Qty: 90 TABLET | Refills: 2 | Status: SHIPPED | OUTPATIENT
Start: 2018-04-02 | End: 2018-07-19 | Stop reason: SDUPTHER

## 2018-04-02 RX ORDER — MONTELUKAST SODIUM 10 MG/1
10 TABLET ORAL DAILY
Refills: 11 | COMMUNITY
Start: 2018-03-30 | End: 2020-08-21

## 2018-04-02 RX ORDER — TIZANIDINE 4 MG/1
16 TABLET ORAL NIGHTLY PRN
Qty: 120 TABLET | Refills: 0 | Status: SHIPPED | OUTPATIENT
Start: 2018-04-02 | End: 2018-05-02

## 2018-04-02 RX ORDER — MODAFINIL 100 MG/1
TABLET ORAL
Qty: 60 TABLET | Refills: 5 | Status: SHIPPED | OUTPATIENT
Start: 2018-04-02 | End: 2018-10-02 | Stop reason: SDUPTHER

## 2018-04-02 NOTE — PROGRESS NOTES
Chief Complaint   Patient presents with    Establish Care    Annual Exam       SUBJECTIVE:  Krystal Wren is a 32 y.o. female here for follow up of annual and to establish care with us.  She has .  Currently has co-morbidities including per problem list.    Updated her chart  Social History   Substance Use Topics    Smoking status: Current Every Day Smoker     Packs/day: 0.50    Smokeless tobacco: Never Used      Comment:     Alcohol use Yes      Comment: socially        Patient Active Problem List    Diagnosis Date Noted    Mast cell activation syndrome 04/02/2018 04/02/2018  noted      Iron deficiency anemia 02/20/2018    Major depressive disorder, recurrent, moderate 03/27/2017    Vitamin D deficiency 05/04/2015    Fatigue 05/01/2015    Joint pain 05/01/2015    Maternal iron deficiency anemia complicating pregnancy in third trimester 12/28/2014    Esophageal reflux 12/28/2014    Sleep apnea 12/28/2014    History of hypertension 08/02/2014     No meds      History of macrosomia in infant in prior pregnancy, currently pregnant 04/24/2014    Fibromyalgia      Since age 14, mainly affects shoulders.         Review of Systems   Constitutional: Positive for malaise/fatigue. Negative for chills, diaphoresis, fever and weight loss.   Eyes: Negative.    Respiratory: Negative.    Cardiovascular: Positive for palpitations and leg swelling.   Gastrointestinal: Negative.    Genitourinary: Negative.    Musculoskeletal: Positive for myalgias.   Skin: Negative for itching and rash.   Neurological: Positive for dizziness, tingling, sensory change, weakness and headaches. Negative for tremors, speech change, focal weakness, seizures and loss of consciousness.   Endo/Heme/Allergies: Negative.    Psychiatric/Behavioral: Positive for depression and memory loss. Negative for hallucinations, substance abuse and suicidal ideas. The patient has insomnia. The patient is not nervous/anxious.   "      OBJECTIVE:  BP (!) 150/104   Pulse (!) 116   Temp 98.9 °F (37.2 °C) (Oral)   Ht 5' 4" (1.626 m)   Wt 88.4 kg (194 lb 14.2 oz)   SpO2 97%   BMI 33.45 kg/m²     Wt Readings from Last 3 Encounters:   04/02/18 88.4 kg (194 lb 14.2 oz)   03/21/18 90.3 kg (199 lb 1.2 oz)   03/14/18 89.8 kg (197 lb 15.6 oz)     BP Readings from Last 3 Encounters:   04/02/18 (!) 150/104   03/21/18 (!) 160/98   03/14/18 (!) 140/104         BP (!) 150/104   Pulse (!) 116   Temp 98.9 °F (37.2 °C) (Oral)   Ht 5' 4" (1.626 m)   Wt 88.4 kg (194 lb 14.2 oz)   SpO2 97%   BMI 33.45 kg/m²     General Appearance:    Alert, cooperative, no distress, appears stated age   Head:    Normocephalic, without obvious abnormality, atraumatic   Eyes:    PERRL, conjunctiva/corneas clear, EOM's intact, fundi     benign, both eyes   Ears:    Normal TM's and external ear canals, both ears   Nose:   Nares normal, septum midline, mucosa normal, no drainage    or sinus tenderness   Throat:   Lips, mucosa, and tongue normal; teeth and gums normal   Neck:   Supple, symmetrical, trachea midline, no adenopathy;     thyroid:  no enlargement/tenderness/nodules; no carotid    bruit or JVD   Back:     Symmetric, no curvature, ROM normal, no CVA tenderness   Lungs:     Clear to auscultation bilaterally, respirations unlabored   Chest Wall:    No tenderness or deformity    Heart:    Regular rate and rhythm, S1 and S2 normal, no murmur, rub   or gallop       Abdomen:     Soft, non-tender, bowel sounds active all four quadrants,     no masses, no organomegaly           Extremities:   Extremities normal, atraumatic, no cyanosis or edema that is gross, she has pain throughout her muscles   Pulses:   2+ and symmetric all extremities   Skin:   Skin color, texture, turgor normal, no rashes or lesions   Lymph nodes:   Cervical, supraclavicular, and axillary nodes normal   Neurologic:   CNII-XII intact, abnormal strength, sensation and reflexes     Throughout  She has " poor muscular conditioning, she moves very slow and guarded.  Has antalgic gait.       Review of old Records:  Reviewed per epic  Seek old records    Review of old labs:    Reviewed per epic    Review of old imaging:  Reviewed per epic      ASSESSMENT:  Problem List Items Addressed This Visit     Sleep apnea    Relevant Medications    modafinil (PROVIGIL) 100 MG Tab    Mast cell activation syndrome    Major depressive disorder, recurrent, moderate    Joint pain    Relevant Medications    traMADol (ULTRAM) 50 mg tablet    methocarbamol (ROBAXIN) 500 MG Tab    Fibromyalgia    Relevant Medications    traMADol (ULTRAM) 50 mg tablet    methocarbamol (ROBAXIN) 500 MG Tab    tiZANidine (ZANAFLEX) 4 MG tablet      Other Visit Diagnoses     Annual physical exam    -  Primary    Tachycardia        Relevant Orders    Holter monitor - 48 hour          ICD-10-CM ICD-9-CM   1. Annual physical exam Z00.00 V70.0   2. Tachycardia R00.0 785.0   3. Major depressive disorder, recurrent, moderate F33.1 296.32   4. Sleep apnea, unspecified type G47.30 780.57   5. Mast cell activation syndrome D89.40 279.8   6. Arthralgia, unspecified joint M25.50 719.40   7. Fibromyalgia M79.7 729.1               PLAN:       Will get a handicap tag, she has multiple challenges, she will use it when needed with her wheelchair and her walker, on her best days she will try to walk.  We will try to optimize her and look at her heart to see if we can use good PT to get her reconditioned.    Tramadol strictly so she can do her ADL's daily, not so she can exercise or be over active.    SANTIAGO is treated, modafinil because of the symptoms and to help with her fatigue and we are working on diet and a slow HEP to help    Get a heart monitor to check her pulse, her b/p is elevated.    She by history and my observation has severe limitations of mobility secondary to fatigue/deconditioning and pain and would benefit from a wheelchair, she will try to self propel but I  fear her UE strength/endurance will not help.    rollator walker to help as well, to get her active.    May need a power chair at some point if not able to improve just for quality of life and to help with her depression.      Medication List with Changes/Refills   New Medications    TIZANIDINE (ZANAFLEX) 4 MG TABLET    Take 4 tablets (16 mg total) by mouth nightly as needed (titrate up slowly).    WHEELCHAIR RIVER    Travel wheel chair, 1 hour per day   Current Medications    BUTALBITAL-ACETAMINOPHEN-CAFFEINE -40 MG (FIORICET, ESGIC) -40 MG PER TABLET    TK 1 T PO Q 6 HOURS AS NEEDED FOR HEADACHES    DICLOFENAC SODIUM 1 % GEL    APPLY 2 GRAMS EXTERNALLY TO THE AFFECTED AREA FOUR TIMES DAILY    ESCITALOPRAM OXALATE (LEXAPRO) 5 MG/5 ML SOLN    Take 10 mg by mouth once daily.    FLUTICASONE (FLOVENT HFA) 220 MCG/ACTUATION INHALER    Inhale 1 puff into the lungs 2 (two) times daily. Controller    IBUPROFEN (ADVIL,MOTRIN) 800 MG TABLET    Take 800 mg by mouth every 6 (six) hours as needed for Pain.    MONTELUKAST (SINGULAIR) 10 MG TABLET    Take 10 mg by mouth once daily.     ONDANSETRON (ZOFRAN-ODT) 8 MG TBDL        PANTOPRAZOLE (PROTONIX) 40 MG TABLET    Take 1 tablet (40 mg total) by mouth once daily.    TURMERIC ROOT EXTRACT 500 MG CAP    Take by mouth.   Changed and/or Refilled Medications    Modified Medication Previous Medication    METHOCARBAMOL (ROBAXIN) 500 MG TAB methocarbamol (ROBAXIN) 500 MG Tab       TAKE 1 TABLET(500 MG) BY MOUTH TWICE DAILY AS NEEDED    TAKE 1 TABLET(500 MG) BY MOUTH TWICE DAILY AS NEEDED    MODAFINIL (PROVIGIL) 100 MG TAB modafinil (PROVIGIL) 100 MG Tab       TAKE 2 TABLETS(200 MG) BY MOUTH EVERY DAY    TAKE 2 TABLETS(200 MG) BY MOUTH EVERY DAY    TRAMADOL (ULTRAM) 50 MG TABLET traMADol (ULTRAM) 50 mg tablet       TK 1 T PO Q 8 H PRN    TK 1 T PO Q 8 H PRN   Discontinued Medications    CHLORZOXAZONE (PARAFON FORTE) 500 MG TAB    Take 500 mg by mouth 4 (four) times daily as  needed.    IBUPROFEN-FAMOTIDINE (DUEXIS) 800-26.6 MG TAB    Take 1 tablet by mouth 3 (three) times daily.    PROMETHAZINE (PHENERGAN) 12.5 MG TAB    Take 1 tablet (12.5 mg total) by mouth 4 (four) times daily as needed.       No Follow-up on file.

## 2018-04-04 NOTE — TELEPHONE ENCOUNTER
----- Message from Tran Barbosa sent at 4/4/2018  9:31 AM CDT -----  Contact: Self   Patient says she was suppose to have an order for a Power wheel chair sent [facility]. Please call patient at [phone].

## 2018-04-05 ENCOUNTER — PATIENT MESSAGE (OUTPATIENT)
Dept: FAMILY MEDICINE | Facility: CLINIC | Age: 33
End: 2018-04-05

## 2018-04-05 RX ORDER — ONDANSETRON 8 MG/1
8 TABLET, ORALLY DISINTEGRATING ORAL EVERY 12 HOURS PRN
Qty: 20 TABLET | Refills: 2 | Status: SHIPPED | OUTPATIENT
Start: 2018-04-05 | End: 2018-11-21

## 2018-04-08 RX ORDER — ONDANSETRON 8 MG/1
TABLET, ORALLY DISINTEGRATING ORAL
Qty: 10 TABLET | Refills: 0 | Status: SHIPPED | OUTPATIENT
Start: 2018-04-08 | End: 2018-06-01 | Stop reason: SDUPTHER

## 2018-04-16 ENCOUNTER — PATIENT MESSAGE (OUTPATIENT)
Dept: FAMILY MEDICINE | Facility: CLINIC | Age: 33
End: 2018-04-16

## 2018-04-18 ENCOUNTER — HOSPITAL ENCOUNTER (OUTPATIENT)
Dept: CARDIOLOGY | Facility: HOSPITAL | Age: 33
Discharge: HOME | End: 2018-04-18
Attending: FAMILY MEDICINE
Payer: COMMERCIAL

## 2018-04-18 DIAGNOSIS — R00.0 TACHYCARDIA: ICD-10-CM

## 2018-04-18 PROCEDURE — 93227 XTRNL ECG REC<48 HR R&I: CPT | Mod: ,,, | Performed by: INTERNAL MEDICINE

## 2018-04-18 PROCEDURE — 93225 XTRNL ECG REC<48 HRS REC: CPT

## 2018-04-24 ENCOUNTER — PATIENT MESSAGE (OUTPATIENT)
Dept: FAMILY MEDICINE | Facility: CLINIC | Age: 33
End: 2018-04-24

## 2018-05-28 ENCOUNTER — PATIENT MESSAGE (OUTPATIENT)
Dept: FAMILY MEDICINE | Facility: CLINIC | Age: 33
End: 2018-05-28

## 2018-06-01 ENCOUNTER — OFFICE VISIT (OUTPATIENT)
Dept: FAMILY MEDICINE | Facility: CLINIC | Age: 33
End: 2018-06-01
Payer: COMMERCIAL

## 2018-06-01 VITALS
BODY MASS INDEX: 33.23 KG/M2 | WEIGHT: 193.56 LBS | DIASTOLIC BLOOD PRESSURE: 88 MMHG | SYSTOLIC BLOOD PRESSURE: 124 MMHG | OXYGEN SATURATION: 99 % | HEART RATE: 95 BPM | TEMPERATURE: 98 F

## 2018-06-01 DIAGNOSIS — F33.1 MAJOR DEPRESSIVE DISORDER, RECURRENT, MODERATE: ICD-10-CM

## 2018-06-01 DIAGNOSIS — D89.40 MAST CELL ACTIVATION SYNDROME: Primary | ICD-10-CM

## 2018-06-01 DIAGNOSIS — K21.9 GASTROESOPHAGEAL REFLUX DISEASE, ESOPHAGITIS PRESENCE NOT SPECIFIED: ICD-10-CM

## 2018-06-01 DIAGNOSIS — M79.7 FIBROMYALGIA: ICD-10-CM

## 2018-06-01 DIAGNOSIS — I15.8 OTHER SECONDARY HYPERTENSION: ICD-10-CM

## 2018-06-01 PROCEDURE — 3008F BODY MASS INDEX DOCD: CPT | Mod: CPTII,S$GLB,, | Performed by: FAMILY MEDICINE

## 2018-06-01 PROCEDURE — 99999 PR PBB SHADOW E&M-EST. PATIENT-LVL III: CPT | Mod: PBBFAC,,, | Performed by: FAMILY MEDICINE

## 2018-06-01 PROCEDURE — 99215 OFFICE O/P EST HI 40 MIN: CPT | Mod: S$GLB,,, | Performed by: FAMILY MEDICINE

## 2018-06-01 RX ORDER — HYDROCODONE BITARTRATE AND ACETAMINOPHEN 5; 300 MG/1; MG/1
TABLET ORAL
Refills: 0 | COMMUNITY
Start: 2018-04-29 | End: 2018-11-21 | Stop reason: SDUPTHER

## 2018-06-01 RX ORDER — TIZANIDINE 4 MG/1
16 TABLET ORAL NIGHTLY PRN
Qty: 120 TABLET | Refills: 0 | Status: SHIPPED | OUTPATIENT
Start: 2018-06-01 | End: 2018-07-01

## 2018-06-01 NOTE — PROGRESS NOTES
Chief Complaint   Patient presents with    Follow-up     previous condition       SUBJECTIVE:  Krystal Wren is a 32 y.o. female here for follow up of chronic conditions and she has some changes per below.  Currently has co-morbidities including per problem list.      Mentally:  Last couple of months doing good, no severe bouts of depression over that period.  She did have some anxiety but felt like it was because of medications and not truly anxious.    Fatigue is improving a little as well.  She has not taken the provigil and she now can't really take it because the abdominal side effects.  She is not having narcoleptic attacks and is even more active.    INsomnia is about the same  Pain is waxing and waning  No real change in those.    After the TAYLOR, she has no more female pain.  She feels like she is well healed from the surgery.    She has problems with nausea and carsickness and she is doing ginger ale and she is having some issues.  ?gastroparesis.      Social History   Substance Use Topics    Smoking status: Current Every Day Smoker     Packs/day: 0.50    Smokeless tobacco: Never Used      Comment:     Alcohol use Yes      Comment: socially        Patient Active Problem List    Diagnosis Date Noted    Other secondary hypertension 06/01/2018    Mast cell activation syndrome 04/02/2018 04/02/2018  noted      Iron deficiency anemia 02/20/2018    Major depressive disorder, recurrent, moderate 03/27/2017    Vitamin D deficiency 05/04/2015    Fatigue 05/01/2015    Joint pain 05/01/2015    Maternal iron deficiency anemia complicating pregnancy in third trimester 12/28/2014    Esophageal reflux 12/28/2014    Sleep apnea 12/28/2014    History of hypertension 08/02/2014     No meds      History of macrosomia in infant in prior pregnancy, currently pregnant 04/24/2014    Fibromyalgia      Since age 14, mainly affects shoulders.         Review of Systems   Constitutional: Positive  for malaise/fatigue. Negative for chills, diaphoresis, fever and weight loss.   HENT: Negative.    Eyes: Negative.    Respiratory: Negative.    Cardiovascular: Negative.    Gastrointestinal: Negative.    Genitourinary: Negative.    Musculoskeletal: Positive for myalgias.   Skin: Negative.    Neurological: Positive for weakness.   Endo/Heme/Allergies: Negative.    Psychiatric/Behavioral: Positive for depression. The patient is nervous/anxious and has insomnia.        OBJECTIVE:  /88   Pulse 95   Temp 98.4 °F (36.9 °C) (Oral)   Wt 87.8 kg (193 lb 9 oz)   SpO2 99%   BMI 33.23 kg/m²     Wt Readings from Last 3 Encounters:   06/01/18 87.8 kg (193 lb 9 oz)   04/02/18 88.4 kg (194 lb 14.2 oz)   03/21/18 90.3 kg (199 lb 1.2 oz)     BP Readings from Last 3 Encounters:   06/01/18 124/88   04/02/18 (!) 150/104   03/21/18 (!) 160/98       She appears well, in no apparent distress.  Alert and oriented times three, pleasant and cooperative. Vital signs are as documented in vital signs section.  S1 and S2 normal, no murmurs, clicks, gallops or rubs. Regular rate and rhythm. Chest is clear; no wheezes or rales. No edema or JVD.  Time spent in counseling and reviewing  She is still smoking    Review of old Records:  Reviewed per [facility]    Review of old labs:    Reviewed last lab work    Review of old imaging:  Reviewed imaging    Reviewed records from specialist with MCAS diagnosis      ASSESSMENT:  Problem List Items Addressed This Visit     Other secondary hypertension    Relevant Medications    HYDROcodone-acetaminophen 5-300 mg Tab    Mast cell activation syndrome - Primary    Relevant Orders    Sedimentation rate    C-reactive protein    Major depressive disorder, recurrent, moderate    Fibromyalgia    Relevant Medications    tiZANidine (ZANAFLEX) 4 MG tablet    Other Relevant Orders    Sedimentation rate    C-reactive protein    Esophageal reflux          ICD-10-CM ICD-9-CM   1. Mast cell activation syndrome D89.40  279.8   2. Fibromyalgia M79.7 729.1   3. Major depressive disorder, recurrent, moderate F33.1 296.32   4. Gastroesophageal reflux disease, esophagitis presence not specified K21.9 530.81   5. Other secondary hypertension I15.8 405.99               PLAN:       See orders.    We will work with the specialist with [facility] to help manage her disease.  She will need to go out of state to see the doctor to get the official diagnosis.    Explained I have no issues following with her.    Potential medication side effects were discussed with the patient; let me know if any occur.  F/u in 2 months.  We had a prolonged discussion about these complex clinical issues and went over the various important aspects to consider. All questions were answered.  Spent >40 minutes with the patient with 1/2 time in face to face counseling about the above.    Medication List with Changes/Refills   New Medications    ALBUTEROL (PROVENTIL) 2.5 MG /3 ML (0.083 %) NEBULIZER SOLUTION    Take 3 mLs (2.5 mg total) by nebulization every 6 (six) hours as needed for Wheezing. Rescue    TIZANIDINE (ZANAFLEX) 4 MG TABLET    Take 4 tablets (16 mg total) by mouth nightly as needed (titrate up slowly).   Current Medications    BUTALBITAL-ACETAMINOPHEN-CAFFEINE -40 MG (FIORICET, ESGIC) -40 MG PER TABLET    TK 1 T PO Q 6 HOURS AS NEEDED FOR HEADACHES    DICLOFENAC SODIUM 1 % GEL    APPLY 2 GRAMS EXTERNALLY TO THE AFFECTED AREA FOUR TIMES DAILY    ESCITALOPRAM OXALATE (LEXAPRO) 5 MG/5 ML SOLN    Take 10 mg by mouth once daily.    FLUTICASONE (FLOVENT HFA) 220 MCG/ACTUATION INHALER    Inhale 1 puff into the lungs 2 (two) times daily. Controller    HYDROCODONE-ACETAMINOPHEN 5-300 MG TAB    TK 1 T PO Q 4 TO 6 H PRN P    IBUPROFEN (ADVIL,MOTRIN) 800 MG TABLET    Take 800 mg by mouth every 6 (six) hours as needed for Pain.    METHOCARBAMOL (ROBAXIN) 500 MG TAB    TAKE 1 TABLET(500 MG) BY MOUTH TWICE DAILY AS NEEDED    MODAFINIL (PROVIGIL) 100 MG TAB     TAKE 2 TABLETS(200 MG) BY MOUTH EVERY DAY    MONTELUKAST (SINGULAIR) 10 MG TABLET    Take 10 mg by mouth once daily.     ONDANSETRON (ZOFRAN-ODT) 8 MG TBDL    Take 1 tablet (8 mg total) by mouth every 12 (twelve) hours as needed.    PANTOPRAZOLE (PROTONIX) 40 MG TABLET    Take 1 tablet (40 mg total) by mouth once daily.    TRAMADOL (ULTRAM) 50 MG TABLET    TK 1 T PO Q 8 H PRN    TURMERIC ROOT EXTRACT 500 MG CAP    Take by mouth.    WALKER MISC    Use daily for safety, rollator walker    WHEELCHAIR RIVER    Travel wheel chair, 1 hour per day   Discontinued Medications    ONDANSETRON (ZOFRAN-ODT) 8 MG TBDL    DISSOLVE 1 TABLET(8 MG) ON THE TONGUE EVERY 12 HOURS AS NEEDED FOR NAUSEA OR VOMITING       No Follow-up on file.

## 2018-06-04 ENCOUNTER — PATIENT MESSAGE (OUTPATIENT)
Dept: FAMILY MEDICINE | Facility: CLINIC | Age: 33
End: 2018-06-04

## 2018-06-05 RX ORDER — ALBUTEROL SULFATE 0.83 MG/ML
2.5 SOLUTION RESPIRATORY (INHALATION) EVERY 6 HOURS PRN
Qty: 1 BOX | Refills: 0 | Status: SHIPPED | OUTPATIENT
Start: 2018-06-05 | End: 2023-08-21

## 2018-07-18 ENCOUNTER — PATIENT MESSAGE (OUTPATIENT)
Dept: FAMILY MEDICINE | Facility: CLINIC | Age: 33
End: 2018-07-18

## 2018-07-18 DIAGNOSIS — M25.50 ARTHRALGIA, UNSPECIFIED JOINT: ICD-10-CM

## 2018-07-18 DIAGNOSIS — M79.7 FIBROMYALGIA: ICD-10-CM

## 2018-07-19 RX ORDER — TRAMADOL HYDROCHLORIDE 50 MG/1
TABLET ORAL
Qty: 90 TABLET | Refills: 2 | Status: SHIPPED | OUTPATIENT
Start: 2018-07-19 | End: 2018-10-22 | Stop reason: SDUPTHER

## 2018-07-20 ENCOUNTER — PATIENT MESSAGE (OUTPATIENT)
Dept: FAMILY MEDICINE | Facility: CLINIC | Age: 33
End: 2018-07-20

## 2018-08-19 ENCOUNTER — PATIENT MESSAGE (OUTPATIENT)
Dept: FAMILY MEDICINE | Facility: CLINIC | Age: 33
End: 2018-08-19

## 2018-08-19 DIAGNOSIS — J44.9 COPD MIXED TYPE: Primary | ICD-10-CM

## 2018-10-02 DIAGNOSIS — G47.30 SLEEP APNEA, UNSPECIFIED TYPE: ICD-10-CM

## 2018-10-02 RX ORDER — MODAFINIL 100 MG/1
TABLET ORAL
Qty: 60 TABLET | Refills: 0 | Status: SHIPPED | OUTPATIENT
Start: 2018-10-02 | End: 2018-11-21 | Stop reason: SDUPTHER

## 2018-10-22 DIAGNOSIS — M79.7 FIBROMYALGIA: ICD-10-CM

## 2018-10-22 DIAGNOSIS — M25.50 ARTHRALGIA, UNSPECIFIED JOINT: ICD-10-CM

## 2018-10-22 RX ORDER — TRAMADOL HYDROCHLORIDE 50 MG/1
TABLET ORAL
Qty: 90 TABLET | Refills: 0 | Status: SHIPPED | OUTPATIENT
Start: 2018-10-22 | End: 2018-11-21 | Stop reason: SDUPTHER

## 2018-11-21 ENCOUNTER — TELEPHONE (OUTPATIENT)
Dept: FAMILY MEDICINE | Facility: CLINIC | Age: 33
End: 2018-11-21

## 2018-11-21 ENCOUNTER — PATIENT MESSAGE (OUTPATIENT)
Dept: ADMINISTRATIVE | Facility: OTHER | Age: 33
End: 2018-11-21

## 2018-11-21 ENCOUNTER — OFFICE VISIT (OUTPATIENT)
Dept: FAMILY MEDICINE | Facility: CLINIC | Age: 33
End: 2018-11-21
Payer: COMMERCIAL

## 2018-11-21 VITALS
BODY MASS INDEX: 32.45 KG/M2 | HEIGHT: 64 IN | SYSTOLIC BLOOD PRESSURE: 110 MMHG | TEMPERATURE: 99 F | WEIGHT: 190.06 LBS | HEART RATE: 98 BPM | OXYGEN SATURATION: 100 % | DIASTOLIC BLOOD PRESSURE: 80 MMHG

## 2018-11-21 DIAGNOSIS — M25.50 ARTHRALGIA, UNSPECIFIED JOINT: ICD-10-CM

## 2018-11-21 DIAGNOSIS — I10 ESSENTIAL HYPERTENSION: Primary | ICD-10-CM

## 2018-11-21 DIAGNOSIS — Z99.89 AMBULATES WITH CANE: ICD-10-CM

## 2018-11-21 DIAGNOSIS — F41.9 ANXIETY: ICD-10-CM

## 2018-11-21 DIAGNOSIS — G47.30 SLEEP APNEA, UNSPECIFIED TYPE: ICD-10-CM

## 2018-11-21 DIAGNOSIS — K21.9 GASTROESOPHAGEAL REFLUX DISEASE, ESOPHAGITIS PRESENCE NOT SPECIFIED: ICD-10-CM

## 2018-11-21 DIAGNOSIS — M79.7 FIBROMYALGIA: ICD-10-CM

## 2018-11-21 PROCEDURE — 99999 PR PBB SHADOW E&M-EST. PATIENT-LVL III: CPT | Mod: PBBFAC,,, | Performed by: FAMILY MEDICINE

## 2018-11-21 PROCEDURE — 3008F BODY MASS INDEX DOCD: CPT | Mod: CPTII,S$GLB,, | Performed by: FAMILY MEDICINE

## 2018-11-21 PROCEDURE — 99215 OFFICE O/P EST HI 40 MIN: CPT | Mod: S$GLB,,, | Performed by: FAMILY MEDICINE

## 2018-11-21 RX ORDER — BUPRENORPHINE HCL 150 MCG
150 FILM, MEDICATED (EA) BUCCAL 2 TIMES DAILY
Qty: 60 EACH | Refills: 5 | Status: SHIPPED | OUTPATIENT
Start: 2018-11-21 | End: 2020-01-10 | Stop reason: SDUPTHER

## 2018-11-21 RX ORDER — TIZANIDINE 4 MG/1
4 TABLET ORAL
COMMUNITY
End: 2018-11-21 | Stop reason: SDUPTHER

## 2018-11-21 RX ORDER — TRAMADOL HYDROCHLORIDE 50 MG/1
TABLET ORAL
Qty: 270 TABLET | Refills: 1 | Status: SHIPPED | OUTPATIENT
Start: 2018-11-21 | End: 2019-06-05 | Stop reason: SDUPTHER

## 2018-11-21 RX ORDER — CHLORZOXAZONE 500 MG/1
100 TABLET ORAL
COMMUNITY
End: 2018-11-21

## 2018-11-21 RX ORDER — PANTOPRAZOLE SODIUM 40 MG/1
40 TABLET, DELAYED RELEASE ORAL DAILY
Qty: 30 TABLET | Refills: 5 | Status: SHIPPED | OUTPATIENT
Start: 2018-11-21 | End: 2020-01-10 | Stop reason: SDUPTHER

## 2018-11-21 RX ORDER — MODAFINIL 100 MG/1
TABLET ORAL
Qty: 60 TABLET | Refills: 5 | Status: SHIPPED | OUTPATIENT
Start: 2018-11-21 | End: 2019-01-15 | Stop reason: SDUPTHER

## 2018-11-21 RX ORDER — LORATADINE 10 MG/1
10 TABLET ORAL
COMMUNITY
End: 2019-01-15

## 2018-11-21 RX ORDER — CROMOLYN SODIUM 100 MG/5ML
200 SOLUTION, CONCENTRATE ORAL
COMMUNITY
Start: 2018-08-10 | End: 2020-08-21

## 2018-11-21 RX ORDER — ONDANSETRON 4 MG/1
TABLET, FILM COATED ORAL
COMMUNITY
End: 2019-01-15 | Stop reason: SDUPTHER

## 2018-11-21 RX ORDER — TIZANIDINE 4 MG/1
4 TABLET ORAL EVERY 6 HOURS PRN
Qty: 360 TABLET | Refills: 3 | Status: SHIPPED | OUTPATIENT
Start: 2018-11-21 | End: 2019-06-05 | Stop reason: SDUPTHER

## 2018-11-21 RX ORDER — VILAZODONE HYDROCHLORIDE 20 MG/1
1 TABLET ORAL DAILY
Qty: 30 TABLET | Refills: 11 | Status: SHIPPED | OUTPATIENT
Start: 2018-11-21 | End: 2019-01-15 | Stop reason: DRUGHIGH

## 2018-11-21 NOTE — TELEPHONE ENCOUNTER
----- Message from Sarah Ding sent at 11/21/2018 12:49 PM CST -----  Contact: Self/ [phone]  Pt returning call to office. Thank you.

## 2018-11-21 NOTE — PROGRESS NOTES
HISTORY OF PRESENT ILLNESS:  Krystal Wren is a 32 y.o. female who presents to the clinic today for Medication Refill and Insomnia  .     Hypertension: The patient reports that they check their blood pressures regularly and blood pressure generally is not well controlled (>139/89). The patient  would like to be enrolled in the digital hypertension program. The patient denies  cardiac chest pain, shortness of breath, lower extremity edema, headaches and side effects of medication. The patient reports problems with  none. The patient  has been compliant with the current medication regimen.  The patient : tries to follow a low salt diet.  Hyperlipidemia: Patient reports that they have not been compliant with low fat diet and regular exercise. Patient reports that they have been compliant with their medication regimen and deny any problems/side effects from the medication.  Depression:  Current depressive symptoms include: she feels like she is having no hope for treatment.. The patient denies any suicidal or homicidal ideation.  GERD: The patient denies any problems with  chest pain. The patient reports that they do need to take acid reducing medication on a regular basis to control their symptoms. The patient  no or minimal alcohol, smoking 1 PPD  Per problem list    Chronic pain is longstanding, PT/OT didn't really help.  She had no use for injections given the widespread pain.  She has used alternative treatments, yoga, meditation, topicals, massage, feaux accupuncture with limited long lasting success,  Has tried multiple medications from tylenol, ASA, NSAID, atypicals and nerve agents along with tramadol and opioids with limited efficacy.  She failed standard fibromyalgia treatment.    PAST MEDICAL HISTORY:  Past Medical History:   Diagnosis Date    Allergy     Asthma     Fibromyalgia     Fibromyalgia 2000    Hyperlipidemia     Hypertension     Irritable bowel syndrome     Migraine     Recurrent  upper respiratory infection (URI)     Urticaria        PAST SURGICAL HISTORY:  Past Surgical History:   Procedure Laterality Date     SECTION      NOSE SURGERY      SINUS SURGERY         SOCIAL HISTORY:  Social History     Socioeconomic History    Marital status:      Spouse name: Elian    Number of children: 1    Years of education: Not on file    Highest education level: Not on file   Social Needs    Financial resource strain: Not on file    Food insecurity - worry: Not on file    Food insecurity - inability: Not on file    Transportation needs - medical: Not on file    Transportation needs - non-medical: Not on file   Occupational History    Occupation: [employer]   Tobacco Use    Smoking status: Current Every Day Smoker     Packs/day: 0.50    Smokeless tobacco: Never Used    Tobacco comment:    Substance and Sexual Activity    Alcohol use: Yes     Comment: socially     Drug use: No    Sexual activity: Yes     Partners: Male     Birth control/protection: OCP   Other Topics Concern    Not on file   Social History Narrative    Pt: [employer]    : Elian - works [employer]    One 9 year old daughter - IOL at 39wks -> [facility]TP -> C/S       FAMILY HISTORY:  Family History   Adopted: Yes   Problem Relation Age of Onset    Hypertension Brother     Asthma Brother     Allergies Brother     Diabetes Sister     Hypertension Sister     Allergies Sister     Psoriasis Daughter     Psoriasis Son     Allergies Son     Lupus Sister         type unknown    Allergies Sister     Rheum arthritis Sister         takes Humira    Breast cancer Neg Hx     Colon cancer Neg Hx     Ovarian cancer Neg Hx     Inflammatory bowel disease Neg Hx     Thyroid disease Neg Hx        ALLERGIES AND MEDICATIONS: updated and reviewed.  Review of patient's allergies indicates:   Allergen Reactions    Ceclor [cefaclor] Hives    Cymbalta [duloxetine]      suicidal    Elavil  [amitriptyline]      fatigue    Lyrica [pregabalin]      edema    Neurontin [gabapentin]      Confusion, brain fog        Medication List           Accurate as of 11/21/18 11:53 AM. If you have any questions, ask your nurse or doctor.               START taking these medications    buprenorphine HCl 150 mcg Film  Commonly known as:  BELBUCA  Place 150 mcg inside cheek 2 (two) times daily.  Started by:  Chirag Singh MD        CHANGE how you take these medications    tiZANidine 4 MG tablet  Commonly known as:  ZANAFLEX  Take 1 tablet (4 mg total) by mouth every 6 (six) hours as needed.  What changed:    · when to take this  · reasons to take this  Changed by:  Chirag Singh MD        CONTINUE taking these medications    albuterol 2.5 mg /3 mL (0.083 %) nebulizer solution  Commonly known as:  PROVENTIL  Take 3 mLs (2.5 mg total) by nebulization every 6 (six) hours as needed for Wheezing. Rescue     butalbital-acetaminophen-caffeine -40 mg -40 mg per tablet  Commonly known as:  FIORICET, ESGIC     cromolyn 100 mg/5 mL solution  Commonly known as:  GASTROCROM     diclofenac sodium 1 % Gel  Commonly known as:  VOLTAREN  APPLY 2 GRAMS EXTERNALLY TO THE AFFECTED AREA FOUR TIMES DAILY     fluticasone 220 mcg/actuation inhaler  Commonly known as:  FLOVENT HFA  Inhale 1 puff into the lungs 2 (two) times daily. Controller     loratadine 10 mg tablet  Commonly known as:  CLARITIN     modafinil 100 MG Tab  Commonly known as:  PROVIGIL  TAKE 2 TABLETS BY MOUTH EVERY DAY     montelukast 10 mg tablet  Commonly known as:  SINGULAIR     ondansetron 4 MG tablet  Commonly known as:  ZOFRAN     pantoprazole 40 MG tablet  Commonly known as:  PROTONIX  Take 1 tablet (40 mg total) by mouth once daily.     ranitidine 150 MG tablet  Commonly known as:  ZANTAC     traMADol 50 mg tablet  Commonly known as:  ULTRAM  TAKE 1 TABLET BY MOUTH EVERY 8 HOURS AS NEEDED     turmeric root extract 500 mg Cap     walker Misc  Use daily for  "safety, rollator walker     wheelchair Martina  Travel wheel chair, 1 hour per day        STOP taking these medications    chlorzoxazone 500 mg Tab  Commonly known as:  PARAFON FORTE  Stopped by:  Chirag Singh MD     methocarbamol 500 MG Tab  Commonly known as:  ROBAXIN  Stopped by:  Chirag Singh MD     ondansetron 8 MG Tbdl  Commonly known as:  ZOFRAN-ODT  Stopped by:  Chirag Singh MD           Where to Get Your Medications      These medications were sent to [facility] [address] [place] [id number] [facility]  [address] [facility], [facility] [address]    Phone:  [phone]   · pantoprazole 40 MG tablet  · tiZANidine 4 MG tablet     You can get these medications from any pharmacy    Bring a paper prescription for each of these medications  · buprenorphine HCl 150 mcg Film  · modafinil 100 MG Tab  · traMADol 50 mg tablet            CARE TEAM:  Patient Care Team:  Chirag Singh MD as PCP - General (Family Medicine)  Earl Min MD as Consulting Physician (Internal Medicine)         REVIEW OF SYSTEMS:  Review of Systems   Constitutional: Positive for fatigue. Negative for activity change, appetite change, chills, diaphoresis, fever and unexpected weight change.   HENT: Negative.    Eyes: Negative.    Respiratory: Negative.    Cardiovascular: Negative.    Gastrointestinal: Negative.    Genitourinary: Negative.    Musculoskeletal: Positive for arthralgias and back pain.   Skin: Negative.    Neurological: Negative.    Psychiatric/Behavioral: Positive for agitation.         PHYSICAL EXAM:  Vitals:    11/21/18 1114   BP: 110/80   Pulse: 98   Temp: 98.9 °F (37.2 °C)     Weight: 86.2 kg (190 lb 0.6 oz)   Height: 5' 4" (162.6 cm)   Body mass index is 32.62 kg/m².    Physical Examination:   She appears well, in no apparent distress.  Alert and oriented times three, pleasant and cooperative. Vital signs are as documented in vital signs section.   she uses a cane to get around  S1 and " S2 normal, no murmurs, clicks, gallops or rubs. Regular rate and rhythm. Chest is clear; no wheezes or rales. No edema or JVD.  She has widespread pain in her limbs and her back  No acute joint redness or swelling noted.  Back with increased tone and antalgic gait.  Favors her right side with the cane.    PEG 3: 14  ASSESSMENT AND PLAN:  1. Sleep apnea, unspecified type  Help with the fatigue  - modafinil (PROVIGIL) 100 MG Tab; TAKE 2 TABLETS BY MOUTH EVERY DAY  Dispense: 60 tablet; Refill: 5    2. Arthralgia, unspecified joint  The current medical regimen is effective;  continue present plan and medications.  Try to get belbuca to avoid opioid  Cost is the issue  - traMADol (ULTRAM) 50 mg tablet; TAKE 1 TABLET BY MOUTH EVERY 8 HOURS AS NEEDED  Dispense: 270 tablet; Refill: 1    3. Fibromyalgia  The current medical regimen is effective;  continue present plan and medications.    - traMADol (ULTRAM) 50 mg tablet; TAKE 1 TABLET BY MOUTH EVERY 8 HOURS AS NEEDED  Dispense: 270 tablet; Refill: 1    4. Gastroesophageal reflux disease, esophagitis presence not specified  The current medical regimen is effective;  continue present plan and medications.    - pantoprazole (PROTONIX) 40 MG tablet; Take 1 tablet (40 mg total) by mouth once daily.  Dispense: 30 tablet; Refill: 5    5. Essential hypertension  Get on digital program  - Hypertension Digital Medicine (HDMP) Enrollment Order  - Hypertension Digital Medicine (HDMP): Assign Onboarding Questionnaires    6. Anxiety  Start trintellix.  Prior medications like savella, cymbalta and lexapro       We had a prolonged discussion about these complex clinical issues and went over the various important aspects to consider. All questions were answered.    Spent >40 minutes with the patient with 1/2 time in face to face counseling about the above.    No future appointments.    No Follow-up on file. or sooner as needed.

## 2018-11-21 NOTE — TELEPHONE ENCOUNTER
Informed patient no one from the [facility] called her, patient was seen in office today

## 2018-12-15 ENCOUNTER — OFFICE VISIT (OUTPATIENT)
Dept: URGENT CARE | Facility: CLINIC | Age: 33
End: 2018-12-15
Payer: COMMERCIAL

## 2018-12-15 VITALS
WEIGHT: 190 LBS | HEART RATE: 97 BPM | TEMPERATURE: 97 F | HEIGHT: 64 IN | SYSTOLIC BLOOD PRESSURE: 136 MMHG | DIASTOLIC BLOOD PRESSURE: 100 MMHG | BODY MASS INDEX: 32.44 KG/M2 | OXYGEN SATURATION: 99 %

## 2018-12-15 DIAGNOSIS — R05.9 COUGH: ICD-10-CM

## 2018-12-15 DIAGNOSIS — J32.9 SINUSITIS, UNSPECIFIED CHRONICITY, UNSPECIFIED LOCATION: ICD-10-CM

## 2018-12-15 DIAGNOSIS — R03.0 ELEVATED BLOOD PRESSURE READING: Primary | ICD-10-CM

## 2018-12-15 PROCEDURE — 99214 OFFICE O/P EST MOD 30 MIN: CPT | Mod: S$GLB,,, | Performed by: NURSE PRACTITIONER

## 2018-12-15 PROCEDURE — 3080F DIAST BP >= 90 MM HG: CPT | Mod: CPTII,S$GLB,, | Performed by: NURSE PRACTITIONER

## 2018-12-15 PROCEDURE — 3075F SYST BP GE 130 - 139MM HG: CPT | Mod: CPTII,S$GLB,, | Performed by: NURSE PRACTITIONER

## 2018-12-15 PROCEDURE — 3008F BODY MASS INDEX DOCD: CPT | Mod: CPTII,S$GLB,, | Performed by: NURSE PRACTITIONER

## 2018-12-15 RX ORDER — AZITHROMYCIN 250 MG/1
TABLET, FILM COATED ORAL
Qty: 6 TABLET | Refills: 0 | Status: SHIPPED | OUTPATIENT
Start: 2018-12-15 | End: 2019-01-15 | Stop reason: SDUPTHER

## 2018-12-15 RX ORDER — IPRATROPIUM BROMIDE 21 UG/1
2 SPRAY, METERED NASAL 3 TIMES DAILY PRN
Qty: 30 ML | Refills: 0 | Status: SHIPPED | OUTPATIENT
Start: 2018-12-15 | End: 2018-12-19

## 2018-12-15 RX ORDER — BENZONATATE 100 MG/1
100 CAPSULE ORAL EVERY 6 HOURS PRN
Qty: 30 CAPSULE | Refills: 1 | Status: SHIPPED | OUTPATIENT
Start: 2018-12-15 | End: 2019-01-15 | Stop reason: ALTCHOICE

## 2018-12-15 NOTE — PATIENT INSTRUCTIONS
"Please follow up with your Primary care provider within 2-5 days if your signs and symptoms have not resolved or worsen.  The usual course of cold symptoms are 10-14 days.     If your condition worsens or fails to improve we recommend that you receive another evaluation at the emergency room immediately or contact your primary medical clinic to discuss your concerns.     You must understand that you have received an Urgent Care treatment only and that you may be released before all of your medical problems are known or treated.   You, the patient, will arrange for follow up care as instructed.     Tylenol or Ibuprofen can also be used as directed for pain/fever unless you have an allergy to them or medical condition such as stomach ulcers, kidney or liver disease or blood thinners etc for which you should not be taking these type of medications.     Take over the counter cough medication as directed as needed for cough.  You should avoid medications with pseudoephedrine or phenylephrine (any medication with "D") if you have high blood pressure as this can cause an elevation in your blood pressure. Instead consider Corcidin HBP as needed to prevent an elevated blood pressure.     Natural remedies of symptoms (as needed) include humidification, saline nasal sprays, and/or steamy showers.  Increase fluids, warm tea with honey, cough drops as needed.  You may also use salt water gargles for sore throat.    IF you received a steroid shot today - As discussed, this can elevate your blood pressure, elevate your blood sugar, water weight gain, nervous energy, redness to the face and dimpling of the skin at the injection site.   Elevated Blood Pressure    Your blood pressure was elevated during your visit to the urgent care.     It was not so high that immediate care was needed but it is recommended that you monitor your blood pressure over the next week or two to make sure that it is not staying elevated.      Please have " your blood pressure taken 2-3 times daily at different times of the day.  Write all of those blood pressures down and record the time that they were taken.     Keep all that information and take it with you to see your Primary Care Physician.     If you are taking antihypertensives, please continue your medication regularly as prescribed.      If your blood pressure is consistently above 140/90 you will need to follow up with your PCP more quickly.     - According to ACEP guidelines, workup and treatment of hypertension in asymptomatic patient is not warranted in the ED:    (1) Initiating treatment for asymptomatic hypertension in the ED is not necessary when patients have follow-up.    (2) Rapidly lowering blood pressure in asymptomatic patients in the ED is unnecessary and may be harmful in some patients.    (3) When ED treatment for asymptomatic hypertension is initiated, blood pressure management should attempt to gradually lower blood pressure and should not be expected to be normalized during the initial ED visit.        Call your doctor immediately or seek emergency care if you have any of the following:  · Chest pain or shortness of breath (call 911)  · Moderate to severe headache  · Weakness in the muscles of your face, arms, or legs  · Trouble speaking  · Extreme drowsiness  · Confusion  · Fainting or dizziness  · Pulsating or rushing sound in your ears  · Unexplained nosebleed  · Weakness, tingling, or numbness of your face, arms, or legs  · Change in vision  · Blood pressure measured at home that is greater than 180/110     You were also provided a DASH diet plan flyer, published by the [employer].   It is the only recommended diet used for lowering your blood pressure.      If antibiotics were prescribed, please take all of the antibiotic as directed. If you are female and on BCP use additional methods to prevent pregnancy while on antibiotics and for one cycle after.     If you were  given narcotics do not drive or operate heavy equipment or machinery while taking these medications.     Tylenol or ibuprofen can also be used as directed for pain unless you have an allergy to them or medical condition such as stomach ulcers, kidney or liver disease or blood thinners etc for which you should not be taking these type of medications.     You have been given an antibiotic to treat your condition today.  Please complete the antibiotic as directed on the bottle.     As with any antibiotics, use probiotics and/or high culture yogurt about 2 hours apart from the antibiotic and about 1 week after the antibiotic to replace the gut vivi lost with antibiotic use.      If you are female and on BCP use additional methods to prevent pregnancy while on antibiotics and for one cycle after.         Sinusitis (Antibiotic Treatment)    The sinuses are air-filled spaces within the bones of the face. They connect to the inside of the nose. Sinusitis is an inflammation of the tissue lining the sinus cavity. Sinus inflammation can occur during a cold. It can also be due to allergies to pollens and other particles in the air. Sinusitis can cause symptoms of sinus congestion and fullness. A sinus infection causes fever, headache and facial pain. There is often green or yellow drainage from the nose or into the back of the throat (post-nasal drip). You have been given antibiotics to treat this condition.  Home care:  · Take the full course of antibiotics as instructed. Do not stop taking them, even if you feel better.  · Drink plenty of water, hot tea, and other liquids. This may help thin mucus. It also may promote sinus drainage.  · Heat may help soothe painful areas of the face. Use a towel soaked in hot water. Or,  the shower and direct the hot spray onto your face. Using a vaporizer along with a menthol rub at night may also help.   · An expectorant containing guaifenesin may help thin the mucus and promote  drainage from the sinuses.  · Over-the-counter decongestants may be used unless a similar medicine was prescribed. Nasal sprays work the fastest. Use one that contains phenylephrine or oxymetazoline. First blow the nose gently. Then use the spray. Do not use these medicines more often than directed on the label or symptoms may get worse. You may also use tablets containing pseudoephedrine. Avoid products that combine ingredients, because side effects may be increased. Read labels. You can also ask the pharmacist for help. (NOTE: Persons with high blood pressure should not use decongestants. They can raise blood pressure.)  · Over-the-counter antihistamines may help if allergies contributed to your sinusitis.    · Do not use nasal rinses or irrigation during an acute sinus infection, unless told to by your health care provider. Rinsing may spread the infection to other sinuses.  · Use acetaminophen or ibuprofen to control pain, unless another pain medicine was prescribed. (If you have chronic liver or kidney disease or ever had a stomach ulcer, talk with your doctor before using these medicines. Aspirin should never be used in anyone under 18 years of age who is ill with a fever. It may cause severe liver damage.)  · Don't smoke. This can worsen symptoms.  Follow-up care  Follow up with your healthcare provider or our staff if you are not improving within the next week.  When to seek medical advice  Call your healthcare provider if any of these occur:  · Facial pain or headache becoming more severe  · Stiff neck  · Unusual drowsiness or confusion  · Swelling of the forehead or eyelids  · Vision problems, including blurred or double vision  · Fever of 100.4ºF (38ºC) or higher, or as directed by your healthcare provider  · Seizure  · Breathing problems  · Symptoms not resolving within 10 days  Date Last Reviewed: 4/13/2015  © 5770-0770 [employer]. [address] [place] [address], [place] [address]. All rights  reserved. This information is not intended as a substitute for professional medical care. Always follow your healthcare professional's instructions.

## 2018-12-15 NOTE — PROGRESS NOTES
"Subjective:       Patient ID: Krystal Wren is a 33 y.o. female.    Vitals:  height is 5' 4" (1.626 m) and weight is 86.2 kg (190 lb). Her oral temperature is 97.3 °F (36.3 °C). Her blood pressure is 136/100 (abnormal) and her pulse is 97. Her oxygen saturation is 99%.     Chief Complaint: Sinus Problem    Sinus Problem   This is a new problem. The current episode started in the past 7 days. The problem is unchanged. There has been no fever. Her pain is at a severity of 4/10. Associated symptoms include congestion, coughing, ear pain, headaches, a hoarse voice, neck pain, sinus pressure, sneezing and a sore throat. Pertinent negatives include no chills, diaphoresis, shortness of breath or swollen glands. Treatments tried: mucinex, benadryl, day/night cold. The treatment provided mild relief.       Constitution: Negative for chills, sweating, fatigue and fever.   HENT: Positive for ear pain, congestion, sinus pressure and sore throat. Negative for sinus pain and voice change.    Neck: Positive for neck pain. Negative for painful lymph nodes.   Eyes: Negative for eye redness.   Respiratory: Positive for cough and sputum production. Negative for chest tightness, bloody sputum, COPD, shortness of breath, stridor, wheezing and asthma.    Gastrointestinal: Negative for nausea and vomiting.   Musculoskeletal: Negative for muscle ache.   Skin: Negative for rash.   Allergic/Immunologic: Positive for sneezing. Negative for seasonal allergies and asthma.   Neurological: Positive for headaches.   Hematologic/Lymphatic: Negative for swollen lymph nodes.       Objective:      Physical Exam   Constitutional: She is oriented to person, place, and time. She appears well-developed and well-nourished. She is cooperative.  Non-toxic appearance. She does not appear ill. No distress.   HENT:   Head: Normocephalic and atraumatic.   Right Ear: Hearing, tympanic membrane, external ear and ear canal normal.   Left Ear: Hearing, " tympanic membrane, external ear and ear canal normal.   Nose: Nose normal. No mucosal edema, rhinorrhea or nasal deformity. No epistaxis. Right sinus exhibits no maxillary sinus tenderness and no frontal sinus tenderness. Left sinus exhibits no maxillary sinus tenderness and no frontal sinus tenderness.   Mouth/Throat: Uvula is midline, oropharynx is clear and moist and mucous membranes are normal. No trismus in the jaw. Normal dentition. No uvula swelling. No posterior oropharyngeal erythema.   Eyes: Conjunctivae and lids are normal. No scleral icterus.   Sclera clear bilat   Neck: Trachea normal, full passive range of motion without pain and phonation normal. Neck supple.   Cardiovascular: Normal rate, regular rhythm, normal heart sounds, intact distal pulses and normal pulses.   Pulmonary/Chest: Effort normal and breath sounds normal. No respiratory distress.   Abdominal: Soft. Normal appearance and bowel sounds are normal. She exhibits no distension. There is no tenderness.   Musculoskeletal: Normal range of motion. She exhibits no edema or deformity.   Neurological: She is alert and oriented to person, place, and time. She exhibits normal muscle tone. Coordination normal.   Skin: Skin is warm, dry and intact. She is not diaphoretic. No pallor.   Psychiatric: She has a normal mood and affect. Her speech is normal and behavior is normal. Judgment and thought content normal. Cognition and memory are normal.   Nursing note and vitals reviewed.      Assessment:       1. Elevated blood pressure reading    2. Sinusitis, unspecified chronicity, unspecified location    3. Cough        Plan:         Elevated blood pressure reading    Sinusitis, unspecified chronicity, unspecified location  -     azithromycin (ZITHROMAX Z-WINSOME) 250 MG tablet; Take 2 tablets (500 mg) on  Day 1,  followed by 1 tablet (250 mg) once daily on Days 2 through 5.  Dispense: 6 tablet; Refill: 0  -     ipratropium (ATROVENT) 0.03 % nasal spray; 2  "sprays by Nasal route 3 (three) times daily as needed. Use no more than 4 days.  Dispense: 30 mL; Refill: 0    Cough  -     benzonatate (TESSALON PERLES) 100 MG capsule; Take 1 capsule (100 mg total) by mouth every 6 (six) hours as needed for Cough.  Dispense: 30 capsule; Refill: 1      Patient Instructions     Please follow up with your Primary care provider within 2-5 days if your signs and symptoms have not resolved or worsen.  The usual course of cold symptoms are 10-14 days.     If your condition worsens or fails to improve we recommend that you receive another evaluation at the emergency room immediately or contact your primary medical clinic to discuss your concerns.     You must understand that you have received an Urgent Care treatment only and that you may be released before all of your medical problems are known or treated.   You, the patient, will arrange for follow up care as instructed.     Tylenol or Ibuprofen can also be used as directed for pain/fever unless you have an allergy to them or medical condition such as stomach ulcers, kidney or liver disease or blood thinners etc for which you should not be taking these type of medications.     Take over the counter cough medication as directed as needed for cough.  You should avoid medications with pseudoephedrine or phenylephrine (any medication with "D") if you have high blood pressure as this can cause an elevation in your blood pressure. Instead consider Corcidin HBP as needed to prevent an elevated blood pressure.     Natural remedies of symptoms (as needed) include humidification, saline nasal sprays, and/or steamy showers.  Increase fluids, warm tea with honey, cough drops as needed.  You may also use salt water gargles for sore throat.    IF you received a steroid shot today - As discussed, this can elevate your blood pressure, elevate your blood sugar, water weight gain, nervous energy, redness to the face and dimpling of the skin at the " injection site.   Elevated Blood Pressure    Your blood pressure was elevated during your visit to the urgent care.     It was not so high that immediate care was needed but it is recommended that you monitor your blood pressure over the next week or two to make sure that it is not staying elevated.      Please have your blood pressure taken 2-3 times daily at different times of the day.  Write all of those blood pressures down and record the time that they were taken.     Keep all that information and take it with you to see your Primary Care Physician.     If you are taking antihypertensives, please continue your medication regularly as prescribed.      If your blood pressure is consistently above 140/90 you will need to follow up with your PCP more quickly.     - According to ACEP guidelines, workup and treatment of hypertension in asymptomatic patient is not warranted in the ED:    (1) Initiating treatment for asymptomatic hypertension in the ED is not necessary when patients have follow-up.    (2) Rapidly lowering blood pressure in asymptomatic patients in the ED is unnecessary and may be harmful in some patients.    (3) When ED treatment for asymptomatic hypertension is initiated, blood pressure management should attempt to gradually lower blood pressure and should not be expected to be normalized during the initial ED visit.        Call your doctor immediately or seek emergency care if you have any of the following:  · Chest pain or shortness of breath (call 911)  · Moderate to severe headache  · Weakness in the muscles of your face, arms, or legs  · Trouble speaking  · Extreme drowsiness  · Confusion  · Fainting or dizziness  · Pulsating or rushing sound in your ears  · Unexplained nosebleed  · Weakness, tingling, or numbness of your face, arms, or legs  · Change in vision  · Blood pressure measured at home that is greater than 180/110     You were also provided a DASH diet plan flyer, published by the  [employer].   It is the only recommended diet used for lowering your blood pressure.      If antibiotics were prescribed, please take all of the antibiotic as directed. If you are female and on BCP use additional methods to prevent pregnancy while on antibiotics and for one cycle after.     If you were given narcotics do not drive or operate heavy equipment or machinery while taking these medications.     Tylenol or ibuprofen can also be used as directed for pain unless you have an allergy to them or medical condition such as stomach ulcers, kidney or liver disease or blood thinners etc for which you should not be taking these type of medications.     You have been given an antibiotic to treat your condition today.  Please complete the antibiotic as directed on the bottle.     As with any antibiotics, use probiotics and/or high culture yogurt about 2 hours apart from the antibiotic and about 1 week after the antibiotic to replace the gut vivi lost with antibiotic use.      If you are female and on BCP use additional methods to prevent pregnancy while on antibiotics and for one cycle after.         Sinusitis (Antibiotic Treatment)    The sinuses are air-filled spaces within the bones of the face. They connect to the inside of the nose. Sinusitis is an inflammation of the tissue lining the sinus cavity. Sinus inflammation can occur during a cold. It can also be due to allergies to pollens and other particles in the air. Sinusitis can cause symptoms of sinus congestion and fullness. A sinus infection causes fever, headache and facial pain. There is often green or yellow drainage from the nose or into the back of the throat (post-nasal drip). You have been given antibiotics to treat this condition.  Home care:  · Take the full course of antibiotics as instructed. Do not stop taking them, even if you feel better.  · Drink plenty of water, hot tea, and other liquids. This may help thin mucus. It also  may promote sinus drainage.  · Heat may help soothe painful areas of the face. Use a towel soaked in hot water. Or,  the shower and direct the hot spray onto your face. Using a vaporizer along with a menthol rub at night may also help.   · An expectorant containing guaifenesin may help thin the mucus and promote drainage from the sinuses.  · Over-the-counter decongestants may be used unless a similar medicine was prescribed. Nasal sprays work the fastest. Use one that contains phenylephrine or oxymetazoline. First blow the nose gently. Then use the spray. Do not use these medicines more often than directed on the label or symptoms may get worse. You may also use tablets containing pseudoephedrine. Avoid products that combine ingredients, because side effects may be increased. Read labels. You can also ask the pharmacist for help. (NOTE: Persons with high blood pressure should not use decongestants. They can raise blood pressure.)  · Over-the-counter antihistamines may help if allergies contributed to your sinusitis.    · Do not use nasal rinses or irrigation during an acute sinus infection, unless told to by your health care provider. Rinsing may spread the infection to other sinuses.  · Use acetaminophen or ibuprofen to control pain, unless another pain medicine was prescribed. (If you have chronic liver or kidney disease or ever had a stomach ulcer, talk with your doctor before using these medicines. Aspirin should never be used in anyone under 18 years of age who is ill with a fever. It may cause severe liver damage.)  · Don't smoke. This can worsen symptoms.  Follow-up care  Follow up with your healthcare provider or our staff if you are not improving within the next week.  When to seek medical advice  Call your healthcare provider if any of these occur:  · Facial pain or headache becoming more severe  · Stiff neck  · Unusual drowsiness or confusion  · Swelling of the forehead or eyelids  · Vision  problems, including blurred or double vision  · Fever of 100.4ºF (38ºC) or higher, or as directed by your healthcare provider  · Seizure  · Breathing problems  · Symptoms not resolving within 10 days  Date Last Reviewed: 4/13/2015  © 2812-5410 [employer]. [address] [place] [address]. All rights reserved. This information is not intended as a substitute for professional medical care. Always follow your healthcare professional's instructions.

## 2018-12-17 ENCOUNTER — PATIENT MESSAGE (OUTPATIENT)
Dept: FAMILY MEDICINE | Facility: CLINIC | Age: 33
End: 2018-12-17

## 2018-12-17 ENCOUNTER — NURSE TRIAGE (OUTPATIENT)
Dept: ADMINISTRATIVE | Facility: CLINIC | Age: 33
End: 2018-12-17

## 2018-12-17 ENCOUNTER — TELEPHONE (OUTPATIENT)
Dept: URGENT CARE | Facility: CLINIC | Age: 33
End: 2018-12-17

## 2018-12-18 ENCOUNTER — PATIENT MESSAGE (OUTPATIENT)
Dept: FAMILY MEDICINE | Facility: CLINIC | Age: 33
End: 2018-12-18

## 2018-12-18 ENCOUNTER — TELEPHONE (OUTPATIENT)
Dept: URGENT CARE | Facility: CLINIC | Age: 33
End: 2018-12-18

## 2018-12-18 NOTE — TELEPHONE ENCOUNTER
Patient called and states she is having problems with her nausea while taking her antibiotics.  She states she has taken zofran and other things prescribed by her GI provider and nothing is helping with the nausea.  Patient states she will quit taking the z-pack and call her primary care provider tomorrow to see what else she may be able to take.

## 2018-12-20 ENCOUNTER — TELEPHONE (OUTPATIENT)
Dept: FAMILY MEDICINE | Facility: CLINIC | Age: 33
End: 2018-12-20

## 2018-12-20 NOTE — TELEPHONE ENCOUNTER
I can't speak with him about her medical condition for 2 reasons.    First, I don't have any of the records or history about what happened to her, so I have very little understanding of what is going on.    Two, we don't have a form on file. So we will have to wait for her to get out of the hospital and do a follow up to assess.    Chirag Singh MD Family Medicine [facility].  Please find me on [employer].  [email or web address]  Thank you for allowing [facility] and myself be a part of your health care team!

## 2018-12-20 NOTE — TELEPHONE ENCOUNTER
----- Message from Sarah Lazo sent at 12/20/2018 11:40 AM CST -----  Contact: pt's  lanny [phone]            Name of Who is Calling: pt       What is the request in detail: pt was put in [facility] in [place]. Wants to discuss medication with dr. Singh. Call pt      Can the clinic reply by [facility]: no      What Number to Call Back [facility]: pt's  lanny [phone]

## 2018-12-20 NOTE — TELEPHONE ENCOUNTER
Patient  wants to spoke with you, patient has been admit to [facility], but we do not have Notice on filed that you can speak with him. Please Advised

## 2019-01-15 ENCOUNTER — PATIENT MESSAGE (OUTPATIENT)
Dept: FAMILY MEDICINE | Facility: CLINIC | Age: 34
End: 2019-01-15

## 2019-01-15 ENCOUNTER — OFFICE VISIT (OUTPATIENT)
Dept: FAMILY MEDICINE | Facility: CLINIC | Age: 34
End: 2019-01-15
Payer: COMMERCIAL

## 2019-01-15 VITALS
HEART RATE: 86 BPM | OXYGEN SATURATION: 99 % | SYSTOLIC BLOOD PRESSURE: 120 MMHG | HEIGHT: 64 IN | BODY MASS INDEX: 32.48 KG/M2 | TEMPERATURE: 99 F | WEIGHT: 190.25 LBS | DIASTOLIC BLOOD PRESSURE: 80 MMHG

## 2019-01-15 DIAGNOSIS — M79.7 FIBROMYALGIA: ICD-10-CM

## 2019-01-15 DIAGNOSIS — G47.30 SLEEP APNEA, UNSPECIFIED TYPE: ICD-10-CM

## 2019-01-15 DIAGNOSIS — D89.40 MAST CELL ACTIVATION SYNDROME: Primary | ICD-10-CM

## 2019-01-15 DIAGNOSIS — Z99.89 AMBULATES WITH CANE: ICD-10-CM

## 2019-01-15 DIAGNOSIS — F33.1 MAJOR DEPRESSIVE DISORDER, RECURRENT, MODERATE: ICD-10-CM

## 2019-01-15 DIAGNOSIS — M25.50 ARTHRALGIA, UNSPECIFIED JOINT: ICD-10-CM

## 2019-01-15 PROCEDURE — 3008F BODY MASS INDEX DOCD: CPT | Mod: CPTII,S$GLB,, | Performed by: FAMILY MEDICINE

## 2019-01-15 PROCEDURE — 99215 OFFICE O/P EST HI 40 MIN: CPT | Mod: S$GLB,,, | Performed by: FAMILY MEDICINE

## 2019-01-15 PROCEDURE — 3079F PR MOST RECENT DIASTOLIC BLOOD PRESSURE 80-89 MM HG: ICD-10-PCS | Mod: CPTII,S$GLB,, | Performed by: FAMILY MEDICINE

## 2019-01-15 PROCEDURE — 3079F DIAST BP 80-89 MM HG: CPT | Mod: CPTII,S$GLB,, | Performed by: FAMILY MEDICINE

## 2019-01-15 PROCEDURE — 3008F PR BODY MASS INDEX (BMI) DOCUMENTED: ICD-10-PCS | Mod: CPTII,S$GLB,, | Performed by: FAMILY MEDICINE

## 2019-01-15 PROCEDURE — 99999 PR PBB SHADOW E&M-EST. PATIENT-LVL III: ICD-10-PCS | Mod: PBBFAC,,, | Performed by: FAMILY MEDICINE

## 2019-01-15 PROCEDURE — 99215 PR OFFICE/OUTPT VISIT, EST, LEVL V, 40-54 MIN: ICD-10-PCS | Mod: S$GLB,,, | Performed by: FAMILY MEDICINE

## 2019-01-15 PROCEDURE — 3074F PR MOST RECENT SYSTOLIC BLOOD PRESSURE < 130 MM HG: ICD-10-PCS | Mod: CPTII,S$GLB,, | Performed by: FAMILY MEDICINE

## 2019-01-15 PROCEDURE — 99999 PR PBB SHADOW E&M-EST. PATIENT-LVL III: CPT | Mod: PBBFAC,,, | Performed by: FAMILY MEDICINE

## 2019-01-15 PROCEDURE — 3074F SYST BP LT 130 MM HG: CPT | Mod: CPTII,S$GLB,, | Performed by: FAMILY MEDICINE

## 2019-01-15 RX ORDER — CETIRIZINE HYDROCHLORIDE 10 MG/1
TABLET ORAL
Refills: 1 | COMMUNITY
Start: 2018-12-21 | End: 2020-07-31 | Stop reason: SDUPTHER

## 2019-01-15 RX ORDER — CYCLOBENZAPRINE HCL 5 MG
5 TABLET ORAL 3 TIMES DAILY PRN
Qty: 90 TABLET | Refills: 1 | Status: SHIPPED | OUTPATIENT
Start: 2019-01-15 | End: 2019-04-15

## 2019-01-15 RX ORDER — VILAZODONE HYDROCHLORIDE 40 MG/1
40 TABLET ORAL DAILY
Qty: 30 TABLET | Refills: 11 | Status: SHIPPED | OUTPATIENT
Start: 2019-01-15 | End: 2020-01-10 | Stop reason: SDUPTHER

## 2019-01-15 RX ORDER — BUTALBITAL, ACETAMINOPHEN AND CAFFEINE 50; 325; 40 MG/1; MG/1; MG/1
TABLET ORAL
Qty: 45 TABLET | Refills: 1 | Status: SHIPPED | OUTPATIENT
Start: 2019-01-15 | End: 2020-08-21 | Stop reason: SDUPTHER

## 2019-01-15 RX ORDER — MODAFINIL 100 MG/1
TABLET ORAL
Qty: 60 TABLET | Refills: 5 | Status: SHIPPED | OUTPATIENT
Start: 2019-01-15 | End: 2019-07-02 | Stop reason: SDUPTHER

## 2019-01-15 RX ORDER — BUSPIRONE HYDROCHLORIDE 5 MG/1
5 TABLET ORAL 2 TIMES DAILY
Qty: 270 TABLET | Refills: 1 | Status: SHIPPED | OUTPATIENT
Start: 2019-01-15 | End: 2020-01-10 | Stop reason: SDUPTHER

## 2019-01-27 NOTE — PROGRESS NOTES
"Chief Complaint   Patient presents with    Chronic Pain       SUBJECTIVE:  Krystal Wren is a 33 y.o. female here for new problem of f/u on her issues since the hospital stay that led to psychiatric hospitalization.  She states she was tired, in pain and she was pushed to her limit and just "blew up".  She had not really rested much and they just sedated her and she couldn't really function with thte medications they had put her on.  Currently has co-morbidities including per problem list.      Past Medical History:   Diagnosis Date    Allergy     Anxiety     Asthma     Depression     Fibromyalgia     Fibromyalgia     Hyperlipidemia     Hypertension     Irritable bowel syndrome     Migraine     Recurrent upper respiratory infection (URI)     Suicidal behavior     Urticaria      Past Surgical History:   Procedure Laterality Date     SECTION      HYSTERECTOMY      NOSE SURGERY      SINUS SURGERY       Social History     Socioeconomic History    Marital status:      Spouse name: Elian    Number of children: 1    Years of education: Not on file    Highest education level: Not on file   Social Needs    Financial resource strain: Not on file    Food insecurity - worry: Not on file    Food insecurity - inability: Not on file    Transportation needs - medical: Not on file    Transportation needs - non-medical: Not on file   Occupational History    Occupation: [employer]   Tobacco Use    Smoking status: Current Every Day Smoker     Packs/day: 0.50    Smokeless tobacco: Never Used    Tobacco comment:    Substance and Sexual Activity    Alcohol use: Yes     Comment: socially     Drug use: No    Sexual activity: Yes     Partners: Male     Birth control/protection: OCP   Other Topics Concern    Not on file   Social History Narrative    Pt: AMPARO    : Elian - works [employer]    One 9 year old daughter - IOL at 39wks -> [facility]TP -> C/S "     Family History   Adopted: Yes   Problem Relation Age of Onset    Hypertension Brother     Asthma Brother     Allergies Brother     Diabetes Sister     Hypertension Sister     Allergies Sister     Psoriasis Daughter     Psoriasis Son     Allergies Son     Lupus Sister         type unknown    Allergies Sister     Rheum arthritis Sister         takes Humira    Breast cancer Neg Hx     Colon cancer Neg Hx     Ovarian cancer Neg Hx     Inflammatory bowel disease Neg Hx     Thyroid disease Neg Hx      Current Outpatient Medications on File Prior to Visit   Medication Sig Dispense Refill    albuterol (PROVENTIL) 2.5 mg /3 mL (0.083 %) nebulizer solution Take 3 mLs (2.5 mg total) by nebulization every 6 (six) hours as needed for Wheezing. Rescue 1 Box 0    buprenorphine HCl (BELBUCA) 150 mcg Film Place 150 mcg inside cheek 2 (two) times daily. 60 each 5    cetirizine (ZYRTEC) 10 MG tablet TK 1 T PO QD  1    cromolyn (GASTROCROM) 100 mg/5 mL solution Take 200 mg by mouth.      diclofenac sodium 1 % Gel APPLY 2 GRAMS EXTERNALLY TO THE AFFECTED AREA FOUR TIMES DAILY 300 g 0    montelukast (SINGULAIR) 10 mg tablet Take 10 mg by mouth once daily.   11    pantoprazole (PROTONIX) 40 MG tablet Take 1 tablet (40 mg total) by mouth once daily. 30 tablet 5    ranitidine (ZANTAC) 150 MG tablet Take 150 mg by mouth.      tiZANidine (ZANAFLEX) 4 MG tablet Take 1 tablet (4 mg total) by mouth every 6 (six) hours as needed. 360 tablet 3    traMADol (ULTRAM) 50 mg tablet TAKE 1 TABLET BY MOUTH EVERY 8 HOURS AS NEEDED 270 tablet 1    turmeric root extract 500 mg Cap Take by mouth.      walker Misc Use daily for safety, rollator walker 1 each 0    wheelchair Martina Travel wheel chair, 1 hour per day 1 each 0     No current facility-administered medications on file prior to visit.      Review of patient's allergies indicates:   Allergen Reactions    Ceclor [cefaclor] Hives    Cymbalta [duloxetine]      suicidal  "   Elavil [amitriptyline]      fatigue    Lyrica [pregabalin]      edema    Neurontin [gabapentin]      Confusion, brain fog         Review of Systems   HENT: Negative for hearing loss.    Eyes: Negative for discharge.   Respiratory: Negative for wheezing.    Cardiovascular: Positive for chest pain and palpitations.   Gastrointestinal: Positive for constipation, diarrhea and vomiting. Negative for blood in stool.   Genitourinary: Negative for dysuria and hematuria.   Musculoskeletal: Positive for neck pain.   Neurological: Positive for weakness and headaches. Seizures: work on functional improvement.   Endo/Heme/Allergies: Negative for polydipsia.       OBJECTIVE:  /80   Pulse 86   Temp 98.6 °F (37 °C) (Oral)   Ht 5' 4" (1.626 m)   Wt 86.3 kg (190 lb 4.1 oz)   LMP 04/21/2018   SpO2 99%   BMI 32.66 kg/m²     Wt Readings from Last 3 Encounters:   01/15/19 86.3 kg (190 lb 4.1 oz)   12/17/18 90.7 kg (200 lb)   12/15/18 86.2 kg (190 lb)     BP Readings from Last 3 Encounters:   01/15/19 120/80   12/18/18 (!) 138/100   12/15/18 (!) 136/100       She appears well, in no apparent distress.  Alert and oriented times three, pleasant and cooperative. Vital signs are as documented in vital signs section.  She has no sinusitis  The abdomen is soft without tenderness, guarding, mass, rebound or organomegaly. Bowel sounds are normal. No CVA tenderness or inguinal adenopathy noted.  She has posterior neck pain and some increased tone.  Overall she seems a little weaker than prior and tired.  She is walking today without her walker or WC and that is imprvoement    Review of old Records:  Reviewed old record from the psychiatric hospital  Will place that in the record    Review of old labs:  Lab Results   Component Value Date    TSH 5.530 (H) 12/17/2018     Lab Results   Component Value Date    WBC 16.74 (H) 12/17/2018    HGB 13.9 12/17/2018    HCT 41.4 12/17/2018    MCV 86 12/17/2018     12/17/2018       " Chemistry        Component Value Date/Time     12/17/2018 2337    K 3.5 12/17/2018 2337     12/17/2018 2337    CO2 22 (L) 12/17/2018 2337    BUN 14 12/17/2018 2337    CREATININE 0.78 12/17/2018 2337     (H) 12/17/2018 2337        Component Value Date/Time    CALCIUM 10.2 12/17/2018 2337    ALKPHOS 90 12/17/2018 2337    AST 25 12/17/2018 2337    ALT 31 12/17/2018 2337    BILITOT 0.4 12/17/2018 2337    ESTGFRAFRICA >60.0 12/17/2018 2337    EGFRNONAA >60.0 12/17/2018 2337        No results found for: CHOL  No results found for: HDL  No results found for: LDLCALC  No results found for: TRIG  No results found for: CHOLHDL      Review of old imaging:  n/a    ASSESSMENT:  Problem List Items Addressed This Visit     Sleep apnea    Relevant Medications    modafinil (PROVIGIL) 100 MG Tab    Mast cell activation syndrome - Primary    Relevant Medications    modafinil (PROVIGIL) 100 MG Tab    Major depressive disorder, recurrent, moderate    Relevant Medications    vilazodone (VIIBRYD) 40 mg Tab tablet    busPIRone (BUSPAR) 5 MG Tab    Other Relevant Orders    Ambulatory referral to Psychiatry    Joint pain    Relevant Medications    butalbital-acetaminophen-caffeine -40 mg (FIORICET, ESGIC) -40 mg per tablet    cyclobenzaprine (FLEXERIL) 5 MG tablet    Fibromyalgia    Relevant Medications    butalbital-acetaminophen-caffeine -40 mg (FIORICET, ESGIC) -40 mg per tablet    cyclobenzaprine (FLEXERIL) 5 MG tablet    Ambulates with cane          ICD-10-CM ICD-9-CM   1. Mast cell activation syndrome D89.40 279.8   2. Sleep apnea, unspecified type G47.30 780.57   3. Major depressive disorder, recurrent, moderate F33.1 296.32   4. Fibromyalgia M79.7 729.1   5. Ambulates with cane Z99.89 V46.8   6. Arthralgia, unspecified joint M25.50 719.40         PLAN:  1. Sleep apnea, unspecified type  The current medical regimen is effective;  continue present plan and medications.    - modafinil  (PROVIGIL) 100 MG Tab; TAKE 2 TABLETS BY MOUTH EVERY DAY  Dispense: 60 tablet; Refill: 5    2. Mast cell activation syndrome  Unclear if she actuall yhas full blown MCAS, may be a variant by history and MCAS specialist, continue to monitor and defer to his care.  - modafinil (PROVIGIL) 100 MG Tab; TAKE 2 TABLETS BY MOUTH EVERY DAY  Dispense: 60 tablet; Refill: 5    3. Major depressive disorder, recurrent, moderate  Potential medication side effects were discussed with the patient; let me know if any occur.  The current medical regimen is effective;  continue present plan and medications.  She is off of most of the medications secondary to unknown interactions and possibly side effects.  Start her back on some treatment and get her to specialist  - vilazodone (VIIBRYD) 40 mg Tab tablet; Take 1 tablet (40 mg total) by mouth once daily.  Dispense: 30 tablet; Refill: 11  - busPIRone (BUSPAR) 5 MG Tab; Take 1 tablet (5 mg total) by mouth 2 (two) times daily.  Dispense: 270 tablet; Refill: 1  - Ambulatory referral to Psychiatry    4. Fibromyalgia  The current medical regimen is effective;  continue present plan and medications.    - butalbital-acetaminophen-caffeine -40 mg (FIORICET, ESGIC) -40 mg per tablet; TK 1 T PO Q 6 HOURS AS NEEDED FOR HEADACHES  Dispense: 45 tablet; Refill: 1  - cyclobenzaprine (FLEXERIL) 5 MG tablet; Take 1 tablet (5 mg total) by mouth 3 (three) times daily as needed for Muscle spasms.  Dispense: 90 tablet; Refill: 1    5. Ambulates with cane  Noted  Continue to work on function    6. Arthralgia, unspecified joint    - butalbital-acetaminophen-caffeine -40 mg (FIORICET, ESGIC) -40 mg per tablet; TK 1 T PO Q 6 HOURS AS NEEDED FOR HEADACHES  Dispense: 45 tablet; Refill: 1  - cyclobenzaprine (FLEXERIL) 5 MG tablet; Take 1 tablet (5 mg total) by mouth 3 (three) times daily as needed for Muscle spasms.  Dispense: 90 tablet; Refill: 1    She will continue with her tramadol and  belbuca treatment for now.       Medication List           Accurate as of 1/15/19 11:59 PM. If you have any questions, ask your nurse or doctor.               START taking these medications    busPIRone 5 MG Tab  Commonly known as:  BUSPAR  Take 1 tablet (5 mg total) by mouth 2 (two) times daily.  Started by:  Chirag Singh MD     cyclobenzaprine 5 MG tablet  Commonly known as:  FLEXERIL  Take 1 tablet (5 mg total) by mouth 3 (three) times daily as needed for Muscle spasms.  Started by:  Chirag Singh MD        CHANGE how you take these medications    vilazodone 40 mg Tab tablet  Commonly known as:  VIIBRYD  Take 1 tablet (40 mg total) by mouth once daily.  What changed:    · medication strength  · how much to take  Changed by:  Chirag Singh MD        CONTINUE taking these medications    albuterol 2.5 mg /3 mL (0.083 %) nebulizer solution  Commonly known as:  PROVENTIL  Take 3 mLs (2.5 mg total) by nebulization every 6 (six) hours as needed for Wheezing. Rescue     buprenorphine HCl 150 mcg Film  Commonly known as:  BELBUCA  Place 150 mcg inside cheek 2 (two) times daily.     butalbital-acetaminophen-caffeine -40 mg -40 mg per tablet  Commonly known as:  FIORICET, ESGIC  TK 1 T PO Q 6 HOURS AS NEEDED FOR HEADACHES     cetirizine 10 MG tablet  Commonly known as:  ZYRTEC     cromolyn 100 mg/5 mL solution  Commonly known as:  GASTROCROM     diclofenac sodium 1 % Gel  Commonly known as:  VOLTAREN  APPLY 2 GRAMS EXTERNALLY TO THE AFFECTED AREA FOUR TIMES DAILY     modafinil 100 MG Tab  Commonly known as:  PROVIGIL  TAKE 2 TABLETS BY MOUTH EVERY DAY     montelukast 10 mg tablet  Commonly known as:  SINGULAIR     pantoprazole 40 MG tablet  Commonly known as:  PROTONIX  Take 1 tablet (40 mg total) by mouth once daily.     ranitidine 150 MG tablet  Commonly known as:  ZANTAC     tiZANidine 4 MG tablet  Commonly known as:  ZANAFLEX  Take 1 tablet (4 mg total) by mouth every 6 (six) hours as needed.     traMADol 50  mg tablet  Commonly known as:  ULTRAM  TAKE 1 TABLET BY MOUTH EVERY 8 HOURS AS NEEDED     turmeric root extract 500 mg Cap     walker Misc  Use daily for safety, rollator walker     wheelchair Martina  Travel wheel chair, 1 hour per day        STOP taking these medications    benzonatate 100 MG capsule  Commonly known as:  TESSALON PERLES  Stopped by:  Chirag Singh MD     dimenhyDRINATE 50 MG tablet  Commonly known as:  DRAMAMINE  Stopped by:  Chirag Singh MD     diphenhydrAMINE 25 mg tablet  Commonly known as:  SOMINEX  Stopped by:  Chirag Singh MD     fluticasone 220 mcg/actuation inhaler  Commonly known as:  FLOVENT HFA  Stopped by:  Chirag Singh MD     loratadine 10 mg tablet  Commonly known as:  CLARITIN  Stopped by:  Chirag Singh MD     methocarbamol 500 MG Tab  Commonly known as:  ROBAXIN  Stopped by:  Chirag Singh MD           Where to Get Your Medications      These medications were sent to [facility] [address] [place] [address] AT [facility] [address] & [address] [address]    Phone:  [phone]   · busPIRone 5 MG Tab  · butalbital-acetaminophen-caffeine -40 mg -40 mg per tablet  · cyclobenzaprine 5 MG tablet  · modafinil 100 MG Tab  · vilazodone 40 mg Tab tablet         No Follow-up on file.

## 2019-04-01 ENCOUNTER — OFFICE VISIT (OUTPATIENT)
Dept: GASTROENTEROLOGY | Facility: CLINIC | Age: 34
End: 2019-04-01
Payer: COMMERCIAL

## 2019-04-01 DIAGNOSIS — R11.2 NAUSEA AND VOMITING, INTRACTABILITY OF VOMITING NOT SPECIFIED, UNSPECIFIED VOMITING TYPE: ICD-10-CM

## 2019-04-01 DIAGNOSIS — R10.9 ABDOMINAL PAIN, UNSPECIFIED ABDOMINAL LOCATION: Primary | ICD-10-CM

## 2019-04-01 PROCEDURE — 99204 PR OFFICE/OUTPT VISIT, NEW, LEVL IV, 45-59 MIN: ICD-10-PCS | Mod: S$GLB,,, | Performed by: INTERNAL MEDICINE

## 2019-04-01 PROCEDURE — 99999 PR PBB SHADOW E&M-EST. PATIENT-LVL III: ICD-10-PCS | Mod: PBBFAC,,, | Performed by: INTERNAL MEDICINE

## 2019-04-01 PROCEDURE — 99204 OFFICE O/P NEW MOD 45 MIN: CPT | Mod: S$GLB,,, | Performed by: INTERNAL MEDICINE

## 2019-04-01 PROCEDURE — 99999 PR PBB SHADOW E&M-EST. PATIENT-LVL III: CPT | Mod: PBBFAC,,, | Performed by: INTERNAL MEDICINE

## 2019-04-01 NOTE — PROGRESS NOTES
Subjective:       Patient ID: Krystal Wren is a 33 y.o. female.    Chief Complaint: Abdominal Pain    This is a 33-year-old female here for evaluation of chronic upper GI symptoms.  She has noted the symptoms for many years with a been worsening over the past 1 year described as a postprandial nausea and vomiting.  The vomiting occurs on a daily basis and has been refractory to Zofran with minimal effects of Phenergan as well. She has a suspected mast cell activation syndrome as well but treatment of this has not improved her GI symptoms whatsoever.  She reports having 3 upper endoscopies over the past 15 years, the most recent 1- 2 years ago.  There is a remote history of peptic ulcer disease due to NSAIDs she believes but this was not shown on her last endoscopy.  She has been followed by a gastroenterologist at [facility].  The vomiting is often postprandial generally several hours after eating and is full of food.  She notes eating soft foods and liquids to improve her symptoms somewhat but did not resolve them, especially the nausea. No unintentional weight loss.  No other exacerbating or relieving factors or other associated symptoms.      The following portions of the patient's history were reviewed and updated as appropriate: allergies, current medications, past family history, past medical history, past social history, past surgical history and problem list.    (Portions of this note were dictated using voice recognition software and may contain dictation related errors in spelling/grammar/syntax not found on text review)  HPI  Review of Systems   Constitutional: Positive for appetite change and fatigue. Negative for chills and fever.   HENT: Negative for postnasal drip and trouble swallowing.    Eyes: Negative for pain and redness.   Respiratory: Negative for chest tightness and shortness of breath.    Cardiovascular: Negative for chest pain and leg swelling.   Gastrointestinal: Positive for  abdominal distention, abdominal pain and nausea. Negative for anal bleeding, blood in stool, constipation, diarrhea and rectal pain.   Endocrine: Negative for cold intolerance and heat intolerance.   Genitourinary: Negative for difficulty urinating and hematuria.   Musculoskeletal: Negative for arthralgias and back pain.   Skin: Negative for color change and pallor.   Allergic/Immunologic: Negative for environmental allergies and food allergies.   Neurological: Negative for dizziness and light-headedness.   Hematological: Negative for adenopathy. Does not bruise/bleed easily.   Psychiatric/Behavioral: Negative for agitation and behavioral problems.       Objective:      Physical Exam   Constitutional: She is oriented to person, place, and time. She appears well-developed and well-nourished. No distress.   HENT:   Head: Normocephalic and atraumatic.   Eyes: Conjunctivae are normal. No scleral icterus.   Neck: Normal range of motion. Neck supple. No tracheal deviation present. No thyromegaly present.   Cardiovascular: Normal rate and regular rhythm. Exam reveals no gallop and no friction rub.   No murmur heard.  Pulmonary/Chest: Effort normal and breath sounds normal. No respiratory distress. She has no wheezes.   Abdominal: Soft. Bowel sounds are normal. She exhibits no distension. There is no tenderness.   Musculoskeletal:        Right wrist: She exhibits normal range of motion and no tenderness.        Left wrist: She exhibits normal range of motion and no tenderness.   Lymphadenopathy:        Head (right side): No submental and no submandibular adenopathy present.        Head (left side): No submental and no submandibular adenopathy present.   Neurological: She is alert and oriented to person, place, and time.   Skin: Skin is warm and dry. No rash noted. She is not diaphoretic. No erythema.   Psychiatric: She has a normal mood and affect. Her behavior is normal.   Nursing note and vitals reviewed.      Assessment:        1. Abdominal pain, unspecified abdominal location    2. Nausea and vomiting, intractability of vomiting not specified, unspecified vomiting type        Plan:   1. GES

## 2019-04-10 ENCOUNTER — HOSPITAL ENCOUNTER (OUTPATIENT)
Dept: RADIOLOGY | Facility: HOSPITAL | Age: 34
Discharge: HOME | End: 2019-04-10
Attending: INTERNAL MEDICINE
Payer: COMMERCIAL

## 2019-04-10 DIAGNOSIS — R11.2 NAUSEA AND VOMITING, INTRACTABILITY OF VOMITING NOT SPECIFIED, UNSPECIFIED VOMITING TYPE: ICD-10-CM

## 2019-04-10 PROCEDURE — 78264 GASTRIC EMPTYING IMG STUDY: CPT | Mod: 26,,, | Performed by: RADIOLOGY

## 2019-04-10 PROCEDURE — A9541 TC99M SULFUR COLLOID: HCPCS

## 2019-04-10 PROCEDURE — 78264 NM GASTRIC EMPTYING: ICD-10-PCS | Mod: 26,,, | Performed by: RADIOLOGY

## 2019-04-10 PROCEDURE — 78264 GASTRIC EMPTYING IMG STUDY: CPT | Mod: TC

## 2019-04-13 ENCOUNTER — PATIENT MESSAGE (OUTPATIENT)
Dept: GASTROENTEROLOGY | Facility: CLINIC | Age: 34
End: 2019-04-13

## 2019-04-15 ENCOUNTER — TELEPHONE (OUTPATIENT)
Dept: GASTROENTEROLOGY | Facility: CLINIC | Age: 34
End: 2019-04-15

## 2019-04-15 NOTE — TELEPHONE ENCOUNTER
----- Message from David Marin MD sent at 4/11/2019 12:27 PM CDT -----  Significantly delayed gastric emptying; can I see her in clinic to discuss treatment? Ok to overbook

## 2019-04-25 ENCOUNTER — PATIENT MESSAGE (OUTPATIENT)
Dept: GASTROENTEROLOGY | Facility: HOSPITAL | Age: 34
End: 2019-04-25

## 2019-04-25 ENCOUNTER — OFFICE VISIT (OUTPATIENT)
Dept: GASTROENTEROLOGY | Facility: CLINIC | Age: 34
End: 2019-04-25
Payer: COMMERCIAL

## 2019-04-25 DIAGNOSIS — K21.9 GASTROESOPHAGEAL REFLUX DISEASE, ESOPHAGITIS PRESENCE NOT SPECIFIED: Primary | ICD-10-CM

## 2019-04-25 DIAGNOSIS — K31.84 GASTROPARESIS: ICD-10-CM

## 2019-04-25 PROCEDURE — 99999 PR PBB SHADOW E&M-EST. PATIENT-LVL III: ICD-10-PCS | Mod: PBBFAC,,, | Performed by: INTERNAL MEDICINE

## 2019-04-25 PROCEDURE — 99214 PR OFFICE/OUTPT VISIT, EST, LEVL IV, 30-39 MIN: ICD-10-PCS | Mod: S$GLB,,, | Performed by: INTERNAL MEDICINE

## 2019-04-25 PROCEDURE — 99214 OFFICE O/P EST MOD 30 MIN: CPT | Mod: S$GLB,,, | Performed by: INTERNAL MEDICINE

## 2019-04-25 PROCEDURE — 99999 PR PBB SHADOW E&M-EST. PATIENT-LVL III: CPT | Mod: PBBFAC,,, | Performed by: INTERNAL MEDICINE

## 2019-04-25 RX ORDER — METOCLOPRAMIDE 5 MG/1
5 TABLET ORAL
Qty: 240 TABLET | Refills: 0 | Status: SHIPPED | OUTPATIENT
Start: 2019-04-25 | End: 2019-06-24

## 2019-04-25 NOTE — PROGRESS NOTES
Subjective:       Patient ID: Krystal Wren is a 33 y.o. female.    Chief Complaint: Follow-up    This is a 33-year-old female here for a follow-up visit regarding abdominal symptoms.  She has noted chronic GI symptoms for over 15 years but they have been worsening recently described as an increasing abdominal distention in addition to nausea and early satiety.  She has switched to a diet is mostly soft foods and liquids without any significant improvement and has been losing weight.  We did a gastric emptying study for her which noted a severe delay.  She remains on PPI therapy.  No other exacerbating or relieving factors or other associated symptoms.  No dark urine, jaundice.  No night sweats or fevers.    The following portions of the patient's history were reviewed and updated as appropriate: allergies, current medications, past family history, past medical history, past social history, past surgical history and problem list.     (Portions of this note were dictated using voice recognition software and may contain dictation related errors in spelling/grammar/syntax not found on text review)    HPI  Review of Systems   Constitutional: Positive for appetite change and fatigue.   Respiratory: Negative for apnea and chest tightness.    Cardiovascular: Negative for chest pain and leg swelling.   Gastrointestinal: Positive for abdominal distention, abdominal pain, nausea and vomiting.       Objective:      Physical Exam   Constitutional: She is oriented to person, place, and time. She appears well-developed and well-nourished. No distress.   HENT:   Head: Normocephalic and atraumatic.   Eyes: Conjunctivae are normal. No scleral icterus.   Neck: Normal range of motion. Neck supple. No tracheal deviation present. No thyromegaly present.   Pulmonary/Chest: Effort normal. No respiratory distress. She has no wheezes.   Abdominal: Soft. Bowel sounds are normal. She exhibits no distension. There is no tenderness.    Neurological: She is alert and oriented to person, place, and time.   Skin: Skin is warm and dry. No rash noted. She is not diaphoretic. No erythema.   Psychiatric: She has a normal mood and affect. Her behavior is normal.   Nursing note and vitals reviewed.      Labs ;reviewed  Assessment:       1. Gastroesophageal reflux disease, esophagitis presence not specified    2. Gastroparesis        Plan:   1. Discussed dietary modifications  2.  Trial of Reglan.  We discussed the risks and benefits of this medication and proper way to take it.  We discussed the side effect profile and the fact this has a black box warning.  If any side effects develop she will discontinue the medicine contact me immediately  3.  Follow-up in 2 months

## 2019-05-21 ENCOUNTER — PATIENT OUTREACH (OUTPATIENT)
Dept: ADMINISTRATIVE | Facility: HOSPITAL | Age: 34
End: 2019-05-21

## 2019-06-05 ENCOUNTER — PATIENT MESSAGE (OUTPATIENT)
Dept: FAMILY MEDICINE | Facility: CLINIC | Age: 34
End: 2019-06-05

## 2019-06-05 DIAGNOSIS — M79.7 FIBROMYALGIA: ICD-10-CM

## 2019-06-05 DIAGNOSIS — M25.50 ARTHRALGIA, UNSPECIFIED JOINT: ICD-10-CM

## 2019-06-05 RX ORDER — TRAMADOL HYDROCHLORIDE 50 MG/1
TABLET ORAL
Qty: 270 TABLET | Refills: 1 | Status: SHIPPED | OUTPATIENT
Start: 2019-06-05 | End: 2020-01-02 | Stop reason: SDUPTHER

## 2019-06-05 RX ORDER — TIZANIDINE 4 MG/1
4 TABLET ORAL EVERY 6 HOURS PRN
Qty: 360 TABLET | Refills: 3 | Status: SHIPPED | OUTPATIENT
Start: 2019-06-05 | End: 2020-07-28 | Stop reason: SDUPTHER

## 2019-06-26 ENCOUNTER — OFFICE VISIT (OUTPATIENT)
Dept: GASTROENTEROLOGY | Facility: CLINIC | Age: 34
End: 2019-06-26
Payer: COMMERCIAL

## 2019-06-26 VITALS — BODY MASS INDEX: 31.9 KG/M2 | WEIGHT: 185.88 LBS

## 2019-06-26 DIAGNOSIS — K31.84 GASTROPARESIS: ICD-10-CM

## 2019-06-26 DIAGNOSIS — K21.9 GASTROESOPHAGEAL REFLUX DISEASE, ESOPHAGITIS PRESENCE NOT SPECIFIED: Primary | ICD-10-CM

## 2019-06-26 PROCEDURE — 99214 OFFICE O/P EST MOD 30 MIN: CPT | Mod: S$GLB,,, | Performed by: INTERNAL MEDICINE

## 2019-06-26 PROCEDURE — 99214 PR OFFICE/OUTPT VISIT, EST, LEVL IV, 30-39 MIN: ICD-10-PCS | Mod: S$GLB,,, | Performed by: INTERNAL MEDICINE

## 2019-06-26 PROCEDURE — 99999 PR PBB SHADOW E&M-EST. PATIENT-LVL II: CPT | Mod: PBBFAC,,, | Performed by: INTERNAL MEDICINE

## 2019-06-26 PROCEDURE — 99999 PR PBB SHADOW E&M-EST. PATIENT-LVL II: ICD-10-PCS | Mod: PBBFAC,,, | Performed by: INTERNAL MEDICINE

## 2019-06-26 PROCEDURE — 3008F BODY MASS INDEX DOCD: CPT | Mod: CPTII,S$GLB,, | Performed by: INTERNAL MEDICINE

## 2019-06-26 PROCEDURE — 3008F PR BODY MASS INDEX (BMI) DOCUMENTED: ICD-10-PCS | Mod: CPTII,S$GLB,, | Performed by: INTERNAL MEDICINE

## 2019-06-26 NOTE — PROGRESS NOTES
Subjective:       Patient ID: Krystal Wren is a 33 y.o. female.    Chief Complaint: Follow-up    This is a 33-year-old female here for a follow-up visit regarding chronic abdominal symptoms. The symptoms have been present for 15 years but they have worsening over the last year described as an increasing abdominal distention in addition to nausea and early satiety.  We did a gastric emptying study for her which noted a severe delay.  She remains on PPI therapy with mild benefit. A trial of reglan helped some of the vomiting, but not the nausea.  No other exacerbating or relieving factors or other associated symptoms.  No dark urine, jaundice.  No night sweats or fevers.     The following portions of the patient's history were reviewed and updated as appropriate: allergies, current medications, past family history, past medical history, past social history, past surgical history and problem list.     (Portions of this note were dictated using voice recognition software and may contain dictation related errors in spelling/grammar/syntax not found on text review)  HPI  Review of Systems   Constitutional: Positive for appetite change and fatigue.   Respiratory: Negative for apnea and chest tightness.    Cardiovascular: Negative for chest pain and leg swelling.   Gastrointestinal: Positive for abdominal distention, abdominal pain, nausea and vomiting.       Objective:      Physical Exam   Constitutional: She is oriented to person, place, and time. She appears well-developed and well-nourished. No distress.   HENT:   Head: Normocephalic and atraumatic.   Eyes: Conjunctivae are normal. No scleral icterus.   Neck: Normal range of motion. Neck supple. No tracheal deviation present. No thyromegaly present.   Pulmonary/Chest: Effort normal. No respiratory distress.   Abdominal: Soft. She exhibits no distension. There is no tenderness.   Neurological: She is alert and oriented to person, place, and time.   Skin: Skin  is warm and dry. No rash noted. She is not diaphoretic. No erythema.   Psychiatric: She has a normal mood and affect. Her behavior is normal.   Nursing note and vitals reviewed.      Assessment:       1. Gastroesophageal reflux disease, esophagitis presence not specified    2. Gastroparesis        Plan:   1. Continue dietary modifications  2. Monitor for cannabinoid hyperemesis syndrome  3. Anti-emetics as needed  4. She will consider egd with botox

## 2019-07-02 ENCOUNTER — OFFICE VISIT (OUTPATIENT)
Dept: FAMILY MEDICINE | Facility: CLINIC | Age: 34
End: 2019-07-02
Payer: COMMERCIAL

## 2019-07-02 VITALS
TEMPERATURE: 99 F | HEART RATE: 91 BPM | WEIGHT: 169.75 LBS | HEIGHT: 64 IN | SYSTOLIC BLOOD PRESSURE: 120 MMHG | BODY MASS INDEX: 28.98 KG/M2 | DIASTOLIC BLOOD PRESSURE: 70 MMHG | OXYGEN SATURATION: 99 %

## 2019-07-02 DIAGNOSIS — K31.84 GASTROPARESIS: ICD-10-CM

## 2019-07-02 DIAGNOSIS — M54.2 CERVICALGIA: ICD-10-CM

## 2019-07-02 DIAGNOSIS — F33.1 MAJOR DEPRESSIVE DISORDER, RECURRENT, MODERATE: ICD-10-CM

## 2019-07-02 DIAGNOSIS — G47.00 INSOMNIA, UNSPECIFIED TYPE: ICD-10-CM

## 2019-07-02 DIAGNOSIS — D50.9 IRON DEFICIENCY ANEMIA, UNSPECIFIED IRON DEFICIENCY ANEMIA TYPE: ICD-10-CM

## 2019-07-02 DIAGNOSIS — D89.40 MAST CELL ACTIVATION SYNDROME: Primary | ICD-10-CM

## 2019-07-02 DIAGNOSIS — Z00.00 ANNUAL PHYSICAL EXAM: ICD-10-CM

## 2019-07-02 DIAGNOSIS — H53.9 VISION DISTURBANCE: ICD-10-CM

## 2019-07-02 DIAGNOSIS — G47.30 SLEEP APNEA, UNSPECIFIED TYPE: ICD-10-CM

## 2019-07-02 PROCEDURE — 99999 PR PBB SHADOW E&M-EST. PATIENT-LVL IV: ICD-10-PCS | Mod: PBBFAC,,, | Performed by: FAMILY MEDICINE

## 2019-07-02 PROCEDURE — 99215 PR OFFICE/OUTPT VISIT, EST, LEVL V, 40-54 MIN: ICD-10-PCS | Mod: S$GLB,,, | Performed by: FAMILY MEDICINE

## 2019-07-02 PROCEDURE — 3008F PR BODY MASS INDEX (BMI) DOCUMENTED: ICD-10-PCS | Mod: CPTII,S$GLB,, | Performed by: FAMILY MEDICINE

## 2019-07-02 PROCEDURE — 99215 OFFICE O/P EST HI 40 MIN: CPT | Mod: S$GLB,,, | Performed by: FAMILY MEDICINE

## 2019-07-02 PROCEDURE — 3078F PR MOST RECENT DIASTOLIC BLOOD PRESSURE < 80 MM HG: ICD-10-PCS | Mod: CPTII,S$GLB,, | Performed by: FAMILY MEDICINE

## 2019-07-02 PROCEDURE — 3074F SYST BP LT 130 MM HG: CPT | Mod: CPTII,S$GLB,, | Performed by: FAMILY MEDICINE

## 2019-07-02 PROCEDURE — 3074F PR MOST RECENT SYSTOLIC BLOOD PRESSURE < 130 MM HG: ICD-10-PCS | Mod: CPTII,S$GLB,, | Performed by: FAMILY MEDICINE

## 2019-07-02 PROCEDURE — 3078F DIAST BP <80 MM HG: CPT | Mod: CPTII,S$GLB,, | Performed by: FAMILY MEDICINE

## 2019-07-02 PROCEDURE — 3008F BODY MASS INDEX DOCD: CPT | Mod: CPTII,S$GLB,, | Performed by: FAMILY MEDICINE

## 2019-07-02 PROCEDURE — 99999 PR PBB SHADOW E&M-EST. PATIENT-LVL IV: CPT | Mod: PBBFAC,,, | Performed by: FAMILY MEDICINE

## 2019-07-02 RX ORDER — QUETIAPINE FUMARATE 25 MG/1
25-100 TABLET, FILM COATED ORAL NIGHTLY PRN
Qty: 60 TABLET | Refills: 0 | Status: SHIPPED | OUTPATIENT
Start: 2019-07-02 | End: 2019-07-02 | Stop reason: SDUPTHER

## 2019-07-02 RX ORDER — MODAFINIL 100 MG/1
TABLET ORAL
Qty: 60 TABLET | Refills: 5 | Status: SHIPPED | OUTPATIENT
Start: 2019-07-02 | End: 2020-01-10 | Stop reason: SDUPTHER

## 2019-07-02 NOTE — PROGRESS NOTES
HISTORY OF PRESENT ILLNESS:  Krystal Wren is a 33 y.o. female who presents to the clinic today for Follow-up and Medication Refill  .   Still with multiple symptoms.  She is having a lot of trouble walking.  She is using her cane, her walker and a wheelchair depending on the symptoms.  She is not getting much help from the modafinil.  She is sleeping worse.  The pain is the main issue.  She is still smoking.  She declined the pneumovax.    Per problem list.      PAST MEDICAL HISTORY:  Past Medical History:   Diagnosis Date    Allergy     Anxiety     Asthma     Depression     Fibromyalgia     Fibromyalgia     Hyperlipidemia     Hypertension     Irritable bowel syndrome     Migraine     Recurrent upper respiratory infection (URI)     Suicidal behavior     Urticaria        PAST SURGICAL HISTORY:  Past Surgical History:   Procedure Laterality Date     SECTION      HYSTERECTOMY      NOSE SURGERY      SINUS SURGERY         SOCIAL HISTORY:  Social History     Socioeconomic History    Marital status:      Spouse name: Elian    Number of children: 1    Years of education: Not on file    Highest education level: Not on file   Occupational History    Occupation: [employer]   Social Needs    Financial resource strain: Somewhat hard    Food insecurity:     Worry: Sometimes true     Inability: Sometimes true    Transportation needs:     Medical: Yes     Non-medical: Yes   Tobacco Use    Smoking status: Current Every Day Smoker     Packs/day: 0.50    Smokeless tobacco: Never Used    Tobacco comment:    Substance and Sexual Activity    Alcohol use: Yes     Frequency: Never     Drinks per session: Patient refused     Binge frequency: Patient refused     Comment: socially     Drug use: No    Sexual activity: Yes     Partners: Male     Birth control/protection: OCP   Lifestyle    Physical activity:     Days per week: Not on file     Minutes per session: Not on file     Stress: Rather much   Relationships    Social connections:     Talks on phone: More than three times a week     Gets together: Patient refused     Attends Spiritism service: Not on file     Active member of club or organization: Yes     Attends meetings of clubs or organizations: More than 4 times per year     Relationship status:    Other Topics Concern    Not on file   Social History Narrative    Pt: AMPARO    : Elian - works [employer]    One 9 year old daughter - IOL at 39wks -> FTP -> C/S       FAMILY HISTORY:  Family History   Adopted: Yes   Problem Relation Age of Onset    Hypertension Brother     Asthma Brother     Allergies Brother     Diabetes Sister     Hypertension Sister     Allergies Sister     Psoriasis Daughter     Psoriasis Son     Allergies Son     Lupus Sister         type unknown    Allergies Sister     Rheum arthritis Sister         takes Humira    Breast cancer Neg Hx     Colon cancer Neg Hx     Ovarian cancer Neg Hx     Inflammatory bowel disease Neg Hx     Thyroid disease Neg Hx        ALLERGIES AND MEDICATIONS: updated and reviewed.  Review of patient's allergies indicates:   Allergen Reactions    Ceclor [cefaclor] Hives    Cymbalta [duloxetine]      suicidal    Elavil [amitriptyline]      fatigue    Lyrica [pregabalin]      edema    Neurontin [gabapentin]      Confusion, brain fog     Medication List with Changes/Refills   New Medications    QUETIAPINE (SEROQUEL) 25 MG TAB    TAKE 1 TO 4 TABLETS(25  MG) BY MOUTH EVERY NIGHT AS NEEDED   Current Medications    ALBUTEROL (PROVENTIL) 2.5 MG /3 ML (0.083 %) NEBULIZER SOLUTION    Take 3 mLs (2.5 mg total) by nebulization every 6 (six) hours as needed for Wheezing. Rescue    BUPRENORPHINE HCL (BELBUCA) 150 MCG FILM    Place 150 mcg inside cheek 2 (two) times daily.    BUSPIRONE (BUSPAR) 5 MG TAB    Take 1 tablet (5 mg total) by mouth 2 (two) times daily.     BUTALBITAL-ACETAMINOPHEN-CAFFEINE -40 MG (FIORICET, ESGIC) -40 MG PER TABLET    TK 1 T PO Q 6 HOURS AS NEEDED FOR HEADACHES    CETIRIZINE (ZYRTEC) 10 MG TABLET    TK 1 T PO QD    CROMOLYN (GASTROCROM) 100 MG/5 ML SOLUTION    Take 200 mg by mouth.    DICLOFENAC SODIUM 1 % GEL    APPLY 2 GRAMS EXTERNALLY TO THE AFFECTED AREA FOUR TIMES DAILY    MONTELUKAST (SINGULAIR) 10 MG TABLET    Take 10 mg by mouth once daily.     PANTOPRAZOLE (PROTONIX) 40 MG TABLET    Take 1 tablet (40 mg total) by mouth once daily.    RANITIDINE (ZANTAC) 150 MG TABLET    Take 150 mg by mouth.    TIZANIDINE (ZANAFLEX) 4 MG TABLET    Take 1 tablet (4 mg total) by mouth every 6 (six) hours as needed.    TRAMADOL (ULTRAM) 50 MG TABLET    TAKE 1 TABLET BY MOUTH EVERY 8 HOURS AS NEEDED    VILAZODONE (VIIBRYD) 40 MG TAB TABLET    Take 1 tablet (40 mg total) by mouth once daily.    WALKER MISC    Use daily for safety, rollator walker    WHEELCHAIR RIVER    Travel wheel chair, 1 hour per day   Changed and/or Refilled Medications    Modified Medication Previous Medication    MODAFINIL (PROVIGIL) 100 MG TAB modafinil (PROVIGIL) 100 MG Tab       TAKE 2 TABLETS BY MOUTH EVERY DAY    TAKE 2 TABLETS BY MOUTH EVERY DAY          CARE TEAM:  Patient Care Team:  Chirag Singh MD as PCP - General (Family Medicine)  Earl Min MD as Consulting Physician (Internal Medicine)  VILMA Flores MD as Obstetrician (Obstetrics)         REVIEW OF SYSTEMS:  Review of Systems   Constitutional: Positive for activity change and unexpected weight change.   HENT: Positive for rhinorrhea and trouble swallowing. Negative for hearing loss.    Eyes: Positive for visual disturbance. Negative for discharge.   Respiratory: Negative for chest tightness and wheezing.    Cardiovascular: Positive for chest pain and palpitations.   Gastrointestinal: Positive for constipation, diarrhea and vomiting. Negative for blood in stool.   Endocrine: Positive for  "polyuria. Negative for polydipsia.   Genitourinary: Positive for difficulty urinating and dysuria. Negative for hematuria and menstrual problem.   Musculoskeletal: Positive for arthralgias and neck pain. Negative for joint swelling.   Neurological: Positive for weakness and headaches.   Psychiatric/Behavioral: Positive for confusion and dysphoric mood.     Multiple sympotms that are waxing and waning.      PHYSICAL EXAM:   Vitals:    07/02/19 1659   BP: 120/70   Pulse: 91   Temp: 99 °F (37.2 °C)     Wt Readings from Last 15 Encounters:   07/02/19 77 kg (169 lb 12.1 oz)   06/26/19 84.3 kg (185 lb 13.6 oz)   01/15/19 86.3 kg (190 lb 4.1 oz)   12/17/18 90.7 kg (200 lb)   12/15/18 86.2 kg (190 lb)   11/21/18 86.2 kg (190 lb 0.6 oz)   06/01/18 87.8 kg (193 lb 9 oz)   04/02/18 88.4 kg (194 lb 14.2 oz)   03/21/18 90.3 kg (199 lb 1.2 oz)   03/14/18 89.8 kg (197 lb 15.6 oz)   02/20/18 90.1 kg (198 lb 10.2 oz)   11/18/17 88.5 kg (195 lb)   11/10/17 84.6 kg (186 lb 8.2 oz)   11/03/17 84.6 kg (186 lb 9.6 oz)   09/07/17 88.5 kg (195 lb)       Weight: 77 kg (169 lb 12.1 oz)   Height: 5' 4" (162.6 cm)   Body mass index is 29.14 kg/m².     she appears somewhat chronically ill, alert and oriented x 3.  Dentition is fair, eyes normal  Vision is grossly normal  Hearing is grossly normal.  Neck with reduced ROM.  Hips with reduced ROM  Lower limb strength is diminished.  She is obese, but has been losing a lot of weight.    Reviewed per epic and GI  ASSESSMENT AND PLAN:  1. Sleep apnea, unspecified type    - modafinil (PROVIGIL) 100 MG Tab; TAKE 2 TABLETS BY MOUTH EVERY DAY  Dispense: 60 tablet; Refill: 5    2. Mast cell activation syndrome    - modafinil (PROVIGIL) 100 MG Tab; TAKE 2 TABLETS BY MOUTH EVERY DAY  Dispense: 60 tablet; Refill: 5    3. Insomnia, unspecified type    4. Cervicalgia    - Ambulatory Referral to Physical/Occupational Therapy    5. Gastroparesis      6. Iron deficiency anemia, unspecified iron deficiency anemia " type      7. Major depressive disorder, recurrent, moderate      8. Annual physical exam    - CBC auto differential; Future  - Comprehensive metabolic panel; Future  - Hemoglobin A1c; Future  - Lipid panel; Future  - TSH; Future  - Uric acid; Future  - Urinalysis; Future  - Sedimentation rate; Future    9. Vision disturbance    - Ambulatory referral to Optometry     She is having a lot of issues and we will do permanent disability with her chronic issues for 4 years until we can get her better.    Losing weight with the gastroparesis.    Get her to specialist.    She will work on her activity level.  She considered a scooter but she isn't sure if worth the hassle to try to get through insurance.  She will let me know, she could qualify giving her symptoms and diagnosis.  Future Appointments   Date Time Provider Department Center   9/19/2019  3:00 PM David Marin MD [facility] [place]       No follow-ups on file. or sooner as needed.

## 2019-07-03 RX ORDER — QUETIAPINE FUMARATE 25 MG/1
TABLET, FILM COATED ORAL
Qty: 360 TABLET | Refills: 0 | Status: SHIPPED | OUTPATIENT
Start: 2019-07-03 | End: 2020-01-10 | Stop reason: SDUPTHER

## 2019-08-07 ENCOUNTER — PATIENT OUTREACH (OUTPATIENT)
Dept: ADMINISTRATIVE | Facility: OTHER | Age: 34
End: 2019-08-07

## 2019-09-05 ENCOUNTER — PATIENT OUTREACH (OUTPATIENT)
Dept: ADMINISTRATIVE | Facility: OTHER | Age: 34
End: 2019-09-05

## 2019-09-06 ENCOUNTER — PATIENT MESSAGE (OUTPATIENT)
Dept: GASTROENTEROLOGY | Facility: CLINIC | Age: 34
End: 2019-09-06

## 2019-09-06 ENCOUNTER — TELEPHONE (OUTPATIENT)
Dept: GASTROENTEROLOGY | Facility: CLINIC | Age: 34
End: 2019-09-06

## 2019-09-11 ENCOUNTER — TELEPHONE (OUTPATIENT)
Dept: GASTROENTEROLOGY | Facility: CLINIC | Age: 34
End: 2019-09-11

## 2019-09-20 ENCOUNTER — TELEPHONE (OUTPATIENT)
Dept: GASTROENTEROLOGY | Facility: CLINIC | Age: 34
End: 2019-09-20

## 2019-10-08 ENCOUNTER — PATIENT OUTREACH (OUTPATIENT)
Dept: ADMINISTRATIVE | Facility: OTHER | Age: 34
End: 2019-10-08

## 2019-10-15 ENCOUNTER — PATIENT OUTREACH (OUTPATIENT)
Dept: ADMINISTRATIVE | Facility: OTHER | Age: 34
End: 2019-10-15

## 2019-10-16 ENCOUNTER — OFFICE VISIT (OUTPATIENT)
Dept: GASTROENTEROLOGY | Facility: CLINIC | Age: 34
End: 2019-10-16
Payer: COMMERCIAL

## 2019-10-16 VITALS
WEIGHT: 170.44 LBS | BODY MASS INDEX: 29.25 KG/M2 | SYSTOLIC BLOOD PRESSURE: 144 MMHG | DIASTOLIC BLOOD PRESSURE: 93 MMHG | HEART RATE: 112 BPM

## 2019-10-16 DIAGNOSIS — K21.9 GASTROESOPHAGEAL REFLUX DISEASE, ESOPHAGITIS PRESENCE NOT SPECIFIED: Primary | ICD-10-CM

## 2019-10-16 DIAGNOSIS — K31.84 GASTROPARESIS: ICD-10-CM

## 2019-10-16 PROCEDURE — 3008F BODY MASS INDEX DOCD: CPT | Mod: CPTII,S$GLB,, | Performed by: INTERNAL MEDICINE

## 2019-10-16 PROCEDURE — 99999 PR PBB SHADOW E&M-EST. PATIENT-LVL III: CPT | Mod: PBBFAC,,, | Performed by: INTERNAL MEDICINE

## 2019-10-16 PROCEDURE — 3080F PR MOST RECENT DIASTOLIC BLOOD PRESSURE >= 90 MM HG: ICD-10-PCS | Mod: CPTII,S$GLB,, | Performed by: INTERNAL MEDICINE

## 2019-10-16 PROCEDURE — 3080F DIAST BP >= 90 MM HG: CPT | Mod: CPTII,S$GLB,, | Performed by: INTERNAL MEDICINE

## 2019-10-16 PROCEDURE — 3077F PR MOST RECENT SYSTOLIC BLOOD PRESSURE >= 140 MM HG: ICD-10-PCS | Mod: CPTII,S$GLB,, | Performed by: INTERNAL MEDICINE

## 2019-10-16 PROCEDURE — 99214 PR OFFICE/OUTPT VISIT, EST, LEVL IV, 30-39 MIN: ICD-10-PCS | Mod: S$GLB,,, | Performed by: INTERNAL MEDICINE

## 2019-10-16 PROCEDURE — 99214 OFFICE O/P EST MOD 30 MIN: CPT | Mod: S$GLB,,, | Performed by: INTERNAL MEDICINE

## 2019-10-16 PROCEDURE — 99999 PR PBB SHADOW E&M-EST. PATIENT-LVL III: ICD-10-PCS | Mod: PBBFAC,,, | Performed by: INTERNAL MEDICINE

## 2019-10-16 PROCEDURE — 3008F PR BODY MASS INDEX (BMI) DOCUMENTED: ICD-10-PCS | Mod: CPTII,S$GLB,, | Performed by: INTERNAL MEDICINE

## 2019-10-16 PROCEDURE — 3077F SYST BP >= 140 MM HG: CPT | Mod: CPTII,S$GLB,, | Performed by: INTERNAL MEDICINE

## 2019-10-16 RX ORDER — SUCRALFATE 1 G/10ML
1 SUSPENSION ORAL 4 TIMES DAILY
Qty: 414 ML | Refills: 0 | Status: SHIPPED | OUTPATIENT
Start: 2019-10-16 | End: 2020-07-28 | Stop reason: ALTCHOICE

## 2019-10-16 NOTE — PATIENT INSTRUCTIONS
EGD Prep Instructions    [facility] [address] [place] [address]    You are scheduled for an EGD with Dr. Marin  On 11/8/19 at [facility] located at [address] [place] [address].  Check in at the admit desk, first floor of the hospital (which is the building on the left).   You will receive a call 2-3 days before your EGD to tell you the time to arrive.  If you have not received a call by the day before your procedure, call the Endoscopy Lab at [phone].    Nothing to eat or drink after midnight before the procedure.  You MAY brush your teeth.    You MAY take your blood pressure, heart, and seizure medication on the morning of the procedure, with a SIP of water.  Hold ALL other medications until after the procedure.    If you are on blood thinners THAT YOU HAVE BEEN INSTRUCTED TO HOLD BY YOUR DOCTOR FOR THIS PROCEDURE, then do NOT take this the morning of your EGD.  Do NOT stop these medications on your own, they must be approved to be held by your doctor.  Your EGD can NOT be done if you are on these medications.  Examples of blood thinners include: Coumadin, Aggrenox, Plavix, Pradaxa, Reapro, Pletal, Xarelto, Ticagrelor, Brilinta, Eliquis, and high dose aspirin (325 mg).  You do not have to stop baby aspirin 81 mg.    You will receive a call 2-3 days before your EGD to tell you the time to arrive.  If you have not received a call by the day before your procedure, call the Endoscopy department at [phone].

## 2019-10-16 NOTE — PROGRESS NOTES
Subjective:       Patient ID: Krystal Wren is a 33 y.o. female.    Chief Complaint: Follow-up    This is a 33-year-old female here for a follow-up visit regarding her chronic abdominal symptoms.  The symptoms have been present for over 15 years but worsening in terms of increasing abdominal distention, early satiety and nausea.  Gastric emptying study was consistent with gastroparesis.  PPI therapy and Reglan have not unfortunately relief solved her symptoms.  Persistent nausea, daily and severe.  No headache, changes in vision.  No unintentional weight loss, dark urine, jaundice.  No other exacerbating or relieving factors or other associated symptoms.    The following portions of the patient's history were reviewed and updated as appropriate: allergies, current medications, past family history, past medical history, past social history, past surgical history and problem list.    (Portions of this note were dictated using voice recognition software and may contain dictation related errors in spelling/grammar/syntax not found on text review)  HPI  Review of Systems   Constitutional: Positive for appetite change and fatigue.   Respiratory: Negative for apnea and chest tightness.    Cardiovascular: Negative for chest pain and leg swelling.   Gastrointestinal: Positive for abdominal distention, abdominal pain, nausea and vomiting.         Objective:      Physical Exam   Constitutional: She is oriented to person, place, and time. She appears well-developed and well-nourished. No distress.   HENT:   Head: Normocephalic and atraumatic.   Eyes: Conjunctivae are normal. No scleral icterus.   Neck: Normal range of motion. Neck supple. No tracheal deviation present. No thyromegaly present.   Pulmonary/Chest: Effort normal. No respiratory distress.   Abdominal: Soft. She exhibits no distension. There is no tenderness.   Neurological: She is alert and oriented to person, place, and time.   Skin: Skin is warm and dry.  No rash noted. She is not diaphoretic. No erythema.   Psychiatric: She has a normal mood and affect. Her behavior is normal.   Nursing note and vitals reviewed.        Labs/imaging; reviewed  Assessment:       1. Gastroesophageal reflux disease, esophagitis presence not specified    2. Gastroparesis        Plan:   1. Tentatively plan EGD with botox  2. Will investigate research options re: gastroparesis  3. Trial of carafate

## 2019-11-06 ENCOUNTER — TELEPHONE (OUTPATIENT)
Dept: ENDOSCOPY | Facility: HOSPITAL | Age: 34
End: 2019-11-06

## 2019-11-06 NOTE — TELEPHONE ENCOUNTER
.Spoke with patient about arrival time @ 0700     NPO status reviewed: Patient must have nothing to eat after midnight.     Medications: Do not take Insulin or oral diabetic medications the day of the procedure.  Take as prescribed: heart, seizure and blood pressure medication in the morning with a sip of water (less than an ounce).  Take any breathing medications and bring inhalers to hospital with you Leave all valuables and jewelry at home.     Wear comfortable clothes to procedure to change into hospital gown You cannot drive for 24 hours after your procedure because you will receive sedation for your procedure to make you comfortable.  A ride must be provided at discharge.                   Date of Service: 08/20/2019    GERIATRICS PROGRESS NOTE     REASON FOR CONSULTATION:  Transitions of care. SUBJECTIVE:  The patient was awake. The patient continues to have word-finding difficulty. She is presently denying pain. I observed the patient ambulating in the hallway with therapy, no pain behaviors on exertion. The patient was redirectable. She required cueing to properly use the wheeled walker. The patient's son was at the bedside at time of my assessment. Per son's report, patient is still confused and he also has identified the patient continues to have word-finding difficulty and garbled speech. Neurology note, pulmonary medicine note and internal medicine note reviewed. HOSPITAL MEDICATION LIST:  Reviewed. REVIEW OF SYSTEMS:  As mentioned in subjective. No pain behaviors at this time, no impulsivity or restlessness. Nursing documentation reviewed, indicated the patient has been disoriented and overnight the patient was irritable. No reports of agitation or difficult behaviors during nursing cares. PHYSICAL EXAMINATION:  GENERAL:  An 80-year-old female, no acute distress. VITAL SIGNS:  Temperature 97.8, heart rate 80, respiratory rate 18, blood pressure 110/58, pulse ox 94%. HEENT:  Atraumatic/normocephalic. Oral mucous membranes are moist.  Sclerae are anicteric. NECK:  Supple, no thyromegaly. HEART:  S1, S2 heard. LUNGS:  Decreased breath sounds in bases, otherwise clear to auscultation bilaterally. ABDOMEN:  Soft, nontender, nondistended, bowel sounds present. PSYCHIATRIC:  The patient is awake, disoriented with word finding difficulty. CAM:  Negative. LABORATORY DATA:  Blood work done on 08/20/2019 revealed a sodium level of 139, potassium 4.1, BUN 14, creatinine 0.74, ALT 27, AST 21, total bilirubin 0.4, vitamin B12 level 1736, white blood cell count 6.1, hemoglobin 13.3, hematocrit 40.9.     MRI brain done on 08/19/2019 showed subacute left parietal lobe infarct with moderate cortical parenchyma hemorrhage. IMPRESSION AND PLAN:  1. 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 2.  Cognitive impairment in the setting of delirium and subacute cerebrovascular accident:  The patient has difficulty answering cognitive screening questions due to aphasia. Recommend continue communication, cognition evaluation. The patient will benefit from a neuropsychological evaluation as outpatient. The patient's son has indicated that he will bring a copy of advanced directive document. 3.  Functional impairment/debility:  Likely post acute therapy, pending progress with physical therapy and occupational therapy. Inpatient rehabilitation recommendations pending. Discussed with Dr. Dennise Knowles, Dr. Tatyana Lawson and therapy. With patient's permission geriatrics recommendations were also reviewed with the patient's son.       Theresa Thibodeaux MD  [facility]  Board Certified:  Geriatric Medicine

## 2019-11-08 ENCOUNTER — HOSPITAL ENCOUNTER (OUTPATIENT)
Facility: HOSPITAL | Age: 34
Discharge: HOME | End: 2019-11-08
Attending: INTERNAL MEDICINE | Admitting: INTERNAL MEDICINE
Payer: COMMERCIAL

## 2019-11-08 ENCOUNTER — ANESTHESIA (OUTPATIENT)
Dept: ENDOSCOPY | Facility: HOSPITAL | Age: 34
End: 2019-11-08
Payer: COMMERCIAL

## 2019-11-08 ENCOUNTER — ANESTHESIA EVENT (OUTPATIENT)
Dept: ENDOSCOPY | Facility: HOSPITAL | Age: 34
End: 2019-11-08
Payer: COMMERCIAL

## 2019-11-08 VITALS
SYSTOLIC BLOOD PRESSURE: 128 MMHG | HEART RATE: 81 BPM | TEMPERATURE: 98 F | HEIGHT: 64 IN | RESPIRATION RATE: 25 BRPM | WEIGHT: 170 LBS | DIASTOLIC BLOOD PRESSURE: 84 MMHG | OXYGEN SATURATION: 100 % | BODY MASS INDEX: 29.02 KG/M2

## 2019-11-08 DIAGNOSIS — K31.84 GASTROPARESIS: ICD-10-CM

## 2019-11-08 PROCEDURE — 43236 UPPR GI SCOPE W/SUBMUC INJ: CPT | Performed by: INTERNAL MEDICINE

## 2019-11-08 PROCEDURE — 27201028 HC NEEDLE, SCLERO: Performed by: INTERNAL MEDICINE

## 2019-11-08 PROCEDURE — 37000009 HC ANESTHESIA EA ADD 15 MINS: Performed by: INTERNAL MEDICINE

## 2019-11-08 PROCEDURE — 63600175 PHARM REV CODE 636 W HCPCS: Mod: JG | Performed by: INTERNAL MEDICINE

## 2019-11-08 PROCEDURE — 43236 UPPR GI SCOPE W/SUBMUC INJ: CPT | Mod: ,,, | Performed by: INTERNAL MEDICINE

## 2019-11-08 PROCEDURE — 43236 PR EGD, FLEX, W/SUBMUC INJECTION(S), ANY SUBSTANCE: ICD-10-PCS | Mod: ,,, | Performed by: INTERNAL MEDICINE

## 2019-11-08 PROCEDURE — 63600175 PHARM REV CODE 636 W HCPCS: Performed by: NURSE ANESTHETIST, CERTIFIED REGISTERED

## 2019-11-08 PROCEDURE — 37000008 HC ANESTHESIA 1ST 15 MINUTES: Performed by: INTERNAL MEDICINE

## 2019-11-08 RX ORDER — FENTANYL CITRATE 50 UG/ML
INJECTION, SOLUTION INTRAMUSCULAR; INTRAVENOUS
Status: DISCONTINUED | OUTPATIENT
Start: 2019-11-08 | End: 2019-11-08

## 2019-11-08 RX ORDER — SODIUM CHLORIDE 0.9 % (FLUSH) 0.9 %
10 SYRINGE (ML) INJECTION
Status: DISCONTINUED | OUTPATIENT
Start: 2019-11-08 | End: 2019-11-08 | Stop reason: HOSPADM

## 2019-11-08 RX ORDER — SODIUM CHLORIDE 9 MG/ML
INJECTION, SOLUTION INTRAVENOUS CONTINUOUS
Status: DISCONTINUED | OUTPATIENT
Start: 2019-11-08 | End: 2019-11-08 | Stop reason: HOSPADM

## 2019-11-08 RX ORDER — PROPOFOL 10 MG/ML
VIAL (ML) INTRAVENOUS
Status: DISCONTINUED | OUTPATIENT
Start: 2019-11-08 | End: 2019-11-08

## 2019-11-08 RX ORDER — LIDOCAINE HCL/PF 100 MG/5ML
SYRINGE (ML) INTRAVENOUS
Status: DISCONTINUED | OUTPATIENT
Start: 2019-11-08 | End: 2019-11-08

## 2019-11-08 RX ADMIN — PROPOFOL 30 MG: 10 INJECTION, EMULSION INTRAVENOUS at 08:11

## 2019-11-08 RX ADMIN — FENTANYL CITRATE 100 MCG: 50 INJECTION, SOLUTION INTRAMUSCULAR; INTRAVENOUS at 08:11

## 2019-11-08 RX ADMIN — PROPOFOL 60 MG: 10 INJECTION, EMULSION INTRAVENOUS at 08:11

## 2019-11-08 RX ADMIN — PROPOFOL 3 MG: 10 INJECTION, EMULSION INTRAVENOUS at 08:11

## 2019-11-08 RX ADMIN — SODIUM CHLORIDE: 0.9 INJECTION, SOLUTION INTRAVENOUS at 07:11

## 2019-11-08 RX ADMIN — ONABOTULINUMTOXINA 100 UNITS: 100 INJECTION, POWDER, LYOPHILIZED, FOR SOLUTION INTRADERMAL; INTRAMUSCULAR at 08:11

## 2019-11-08 RX ADMIN — LIDOCAINE HYDROCHLORIDE 100 MG: 20 INJECTION, SOLUTION INTRAVENOUS at 08:11

## 2019-11-08 RX ADMIN — PROPOFOL 40 MG: 10 INJECTION, EMULSION INTRAVENOUS at 08:11

## 2019-11-08 NOTE — TRANSFER OF CARE
"Anesthesia Transfer of Care Note    Patient: Krystal Wren    Procedure(s) Performed: Procedure(s) (LRB):  ESOPHAGOGASTRODUODENOSCOPY (EGD) (N/A)    Patient location: GI    Anesthesia Type: MAC    Transport from OR: Transported from OR on room air with adequate spontaneous ventilation    Post pain: adequate analgesia    Post assessment: no apparent anesthetic complications and tolerated procedure well    Post vital signs: stable    Level of consciousness: awake, alert and oriented    Nausea/Vomiting: no nausea/vomiting    Complications: none    Transfer of care protocol was followed      Last vitals:   Visit Vitals  BP (!) 142/87 (BP Location: Left arm, Patient Position: Lying)   Pulse 98   Temp 37.1 °C (98.8 °F) (Temporal)   Resp 18   Ht 5' 4" (1.626 m)   Wt 77.1 kg (170 lb)   LMP 04/21/2018   SpO2 100%   Breastfeeding? No   BMI 29.18 kg/m²     "

## 2019-11-08 NOTE — [employer] PATIENT INSTRUCTIONS
Discharge Summary/Instructions after an Endoscopic Procedure  Patient Name: Krystal Wren  Patient MRN: [id number]  Patient YOB: 1985 Friday, November 08, 2019  David Marin MD  RESTRICTIONS:  During your procedure today, you received medications for sedation.  These   medications may affect your judgment, balance and coordination.  Therefore,   for 24 hours, you have the following restrictions:   - DO NOT drive a car, operate machinery, make legal/financial decisions,   sign important papers or drink alcohol.    ACTIVITY:  Today: no heavy lifting, straining or running due to procedural   sedation/anesthesia.  The following day: return to full activity including work.  DIET:  Eat and drink normally unless instructed otherwise.     TREATMENT FOR COMMON SIDE EFFECTS:  - Mild abdominal pain, nausea, belching, bloating or excessive gas:  rest,   eat lightly and use a heating pad.  - Sore Throat: treat with throat lozenges and/or gargle with warm salt   water.  - Because air was used during the procedure, expelling large amounts of air   from your rectum or belching is normal.  - If a bowel prep was taken, you may not have a bowel movement for 1-3 days.    This is normal.  SYMPTOMS TO WATCH FOR AND REPORT TO YOUR PHYSICIAN:  1. Abdominal pain or bloating, other than gas cramps.  2. Chest pain.  3. Back pain.  4. Signs of infection such as: chills or fever occurring within 24 hours   after the procedure.  5. Rectal bleeding, which would show as bright red, maroon, or black stools.   (A tablespoon of blood from the rectum is not serious, especially if   hemorrhoids are present.)  6. Vomiting.  7. Weakness or dizziness.  GO DIRECTLY TO THE NEAREST EMERGENCY ROOM IF YOU HAVE ANY OF THE FOLLOWING:      Difficulty breathing              Chills and/or fever over 101 F   Persistent vomiting and/or vomiting blood   Severe abdominal pain   Severe chest pain   Black, tarry stools   Bleeding- more than one  tablespoon   Any other symptom or condition that you feel may need urgent attention  Your doctor recommends these additional instructions:  If any biopsies were taken, your doctors clinic will contact you in 1 to 2   weeks with any results.  - Discharge patient to home.   - Patient has a contact number available for emergencies.  The signs and   symptoms of potential delayed complications were discussed with the   patient.  Return to normal activities tomorrow.  Written discharge   instructions were provided to the patient.   - Gastroparesis diet.   - Continue present medications.  For questions, problems or results please call your physician - David Marin MD at Work:  [phone].  EMERGENCY PHONE NUMBER: [phone],  LAB RESULTS: [phone]  IF A COMPLICATION OR EMERGENCY SITUATION ARISES AND YOU ARE UNABLE TO REACH   YOUR PHYSICIAN - GO DIRECTLY TO THE EMERGENCY ROOM.  David Marin MD  11/8/2019 8:32:50 AM  This report has been verified and signed electronically.  [employer]

## 2019-11-08 NOTE — DISCHARGE INSTRUCTIONS
Gastroparesis     Gastroparesis means that food and fluids move too slowly out of the stomach into the duodenum.   Gastroparesis (also called delayed gastric emptying) happens when the stomach takes longer than normal to empty of food. This is due to a problem with motility (the movement of the muscles in the digestive tract). For many people, gastroparesis is a lifelong condition. But treatment can help relieve symptoms and prevent complications. Read on to learn more about gastroparesis and how it can be managed.  How gastroparesis develops  With normal motility, signals from nerves tell the stomach muscles when to contract. These muscles move food from the stomach into the duodenum (the first part of the small bowel). With gastroparesis, the nerves or muscles are damaged. This causes motility to slow down or stop completely. As a result, food cannot move from the stomach properly. This delayed emptying can cause nausea, vomiting, and other symptoms. Malnutrition can result. Bezoars (hardened lumps of food) can form in the stomach and cause other complications as well.  Causes of gastroparesis  Gastroparesis can be caused by any of the following:  · Diabetes  · Surgery involving any of the digestive organs, such as the stomach and bowels  · Certain medicines, such as strong pain medicines (narcotics)  · Certain conditions, such as systemic scleroderma, Parkinson disease, and thyroid disease  · After a viral illness  In many cases, the cause of gastroparesis cannot be found.  Signs and symptoms of gastroparesis  These can include:  · Nausea and vomiting  · Feeling full quickly when eating  · Belly pain  · Heartburn  · Belly bloating  · Weight loss  · Loss of appetite  · High and low blood sugar levels (in people with diabetes)  Diagnosing gastroparesis  Your healthcare provider will ask about your symptoms and health history. Youll also be examined. In addition, blood tests and X-rays are often done to check  your health and rule out other problems. To confirm the problem, you may need other tests as well. These can include:  · Upper endoscopy. This is done to see inside the stomach and duodenum. For the test, an endoscope is used. This is a thin, flexible tube with a tiny camera on the end. Its inserted through the mouth and down into the stomach and duodenum.  · Upper gastrointestinal (GI) series. This is done to take X-rays of the upper GI tract from the mouth to the small bowel. For the test, a substance called barium is used. The barium coats the upper GI tract so that it will show up clearly on X-rays.  · Gastric emptying scan. This is done to measure how quickly food leaves the stomach. For the test, a meal containing a harmless radioactive substance (tracer) is eaten. Then scans of the stomach are done. The tracer shows up clearly on the scans and shows the movement of the food through the stomach.  · Antroduodenal manometry. This test gives pressure measurements of the stomach and small intestine to check how the contractions are working.  · Newer tests. These are being created and include breath tests and wireless capsule studies   Treating gastroparesis  The goal of treatment is to help you manage your condition. Treatment may include one or more of the following:  · Dietary changes. You may need to make changes to your eating habits and daily diet. For instance, your healthcare provider may instruct you to eat small meals throughout the day. Doing this can keep you from feeling full too quickly. You may be placed on a liquid or soft diet. This means youll eat liquid foods or foods that are mashed or put through a . In addition, you may need to avoid foods high in fats and fiber. These can slow digestion. For more help with your diet, your healthcare provider can refer you to a dietitian. In severe cases, you may need a feeding tube. This sends liquid food or medicine directly to your small bowel,  bypassing the stomach.  · Treating diabetes. If you are diabetic, it is important to control your blood sugar. High sugar levels worsen gastroparesis.   · Medicines. These can help manage symptoms, such as nausea and vomiting. They can also improve motility. Each medicine has specific risks and side effects. Your doctor can tell you more about any medicine that is prescribed for you.  · Surgery. You may need to have a tube surgically inserted into the stomach. The tube removes excess air and fluid. This can relieve severe symptoms of nausea and vomiting. In rare cases, other surgery may be needed on the stomach or small bowel. This is to create a new passageway for food to be emptied from the stomach.  · Gastric electrical stimulation. This treatment is done less often and may not be available. Your healthcare provider can tell you more about this treatment if it is a choice for you.  Diabetes and gastroparesis  If you have diabetes, gastroparesis can make it harder to manage your blood sugar level. Youll need to take extra steps in your treatment to prevent complications. Work with your healthcare provider to learn what you can do to protect your health. For more information, contact the American Diabetes Association, www.diabetes.org.   Long-term concerns   With treatment, most people can manage their symptoms and maintain their usual routines. If your symptoms are moderate to severe, you may need to see your healthcare provider more often for checkups. Also, other treatments will likely be needed.  Date Last Reviewed: 7/14/2016  © 7756-6878 [employer]. [address] [place] [address], [place] [address]. All rights reserved. This information is not intended as a substitute for professional medical care. Always follow your healthcare professional's instructions.

## 2019-11-08 NOTE — INTERVAL H&P NOTE
The patient has been examined and the H&P has been reviewed:    I concur with the findings and no changes have occurred since H&P was written.    Anesthesia/Surgery risks, benefits and alternative options discussed and understood by patient/family.          Active Hospital Problems    Diagnosis  POA    Gastroparesis [K31.84]  Yes      Resolved Hospital Problems   No resolved problems to display.

## 2019-11-08 NOTE — PLAN OF CARE
Past Medical History:   Diagnosis Date    Allergy     Anxiety     Asthma     Depression     Fibromyalgia     Fibromyalgia 2000    Gastroparesis     Hyperlipidemia     Hypertension     Irritable bowel syndrome     Migraine     Recurrent upper respiratory infection (URI)     Suicidal behavior     Urticaria      Recovery complete. Patient recovered to baseline. Discharge instructions reviewed with patient. VSS.

## 2019-11-08 NOTE — ANESTHESIA POSTPROCEDURE EVALUATION
Anesthesia Post Evaluation    Patient: Krystal Wren    Procedure(s) Performed: Procedure(s) (LRB):  ESOPHAGOGASTRODUODENOSCOPY (EGD) (N/A)    Final Anesthesia Type: MAC  Patient location during evaluation: GI PACU  Patient participation: Yes- Able to Participate  Level of consciousness: awake and alert and oriented  Post-procedure vital signs: reviewed and stable  Pain management: adequate  Airway patency: patent  PONV status at discharge: No PONV  Anesthetic complications: no      Cardiovascular status: blood pressure returned to baseline, hemodynamically stable and stable  Respiratory status: unassisted, spontaneous ventilation and room air  Hydration status: euvolemic  Follow-up not needed.          Vitals Value Taken Time   /87 11/8/2019  7:43 AM   Temp 37.1 °C (98.8 °F) 11/8/2019  7:43 AM   Pulse 98 11/8/2019  7:43 AM   Resp 18 11/8/2019  7:43 AM   SpO2 100 % 11/8/2019  7:43 AM         No case tracking events are documented in the log.      Pain/Riccardo Score: No data recorded

## 2019-11-08 NOTE — ANESTHESIA PREPROCEDURE EVALUATION
11/08/2019  Krystal Wren is a 33 y.o., female with HTN, GERD, SANTIAGO, and depression here for EGD .    Anesthesia Evaluation    I have reviewed the Patient Summary Reports.    I have reviewed the Nursing Notes.   I have reviewed the Medications.     Review of Systems  Cardiovascular:   Hypertension    Pulmonary:   COPD, mild Asthma Sleep Apnea    Hepatic/GI:   GERD    Neurological:   Neuromuscular Disease, Headaches    Psych:   anxiety depression          Physical Exam  General:  Well nourished    Airway/Jaw/Neck:  Airway Findings: Mouth Opening: Normal Tongue: Normal  General Airway Assessment: Adult  Mallampati: III  Improves to II with phonation.  TM Distance: Normal, at least 6 cm      Dental:  Dental Findings: In tact             Anesthesia Plan  Type of Anesthesia, risks & benefits discussed:  Anesthesia Type:  MAC, general  Patient's Preference:   Intra-op Monitoring Plan:   Intra-op Monitoring Plan Comments:   Post Op Pain Control Plan:   Post Op Pain Control Plan Comments:   Induction:   IV  Beta Blocker:  Patient is not currently on a Beta-Blocker (No further documentation required).       Informed Consent: Patient understands risks and agrees with Anesthesia plan.  Questions answered. Anesthesia consent signed with patient.  ASA Score: 2     Day of Surgery Review of History & Physical: I have interviewed and examined the patient. I have reviewed the patient's H&P dated:            Ready For Surgery From Anesthesia Perspective.

## 2019-12-19 NOTE — TELEPHONE ENCOUNTER
"Trying to reach Dr. Singh, she has  sinusitis over the last few days, was seen in [facility] and they gave her antibiotics, but she's unable to keep the abx down due to gastroparesis.  Having ear pain, headache, cough.    S/w Dr. Hurley, she gave me vo for Azithromycin in liquid form. Advised messaging Dr. Singh.  After disconnecting, pt asked if she couuld have a rx for phenergan as well.  Called to dr. Hurley again, left , awaiting response.    Reason for Disposition   [1] Request for URGENT new prescription or refill of "essential" medication (i.e., likelihood of harm to patient if not taken) AND [2] triager unable to fill per unit policy    Answer Assessment - Initial Assessment Questions  1. SYMPTOMS: "Do you have any symptoms?"     Unable to tolerate Antibiotics that she's been given for sinusitis  2. SEVERITY: If symptoms are present, ask "Are they mild, moderate or severe?"      Causing nausea/vomiting.    Protocols used: ST MEDICATION QUESTION CALL-A-AH      "
Please call and check on her
rec'd call back from Dr. Hurley, she gave vo vor phenergan 12.5 mg po tid prn n/v, dispense #5, no refills, called it in to [facility] Shaw at pt's pharmacy of choice. called pt's  back and informed him of the above, will message Dr. Singh, as Dr. Hurley wants her to follow up with him tomorrow.  
[FreeTextEntry1] : I had the pleasure of seeing your patient, JYOTI HARRY, in consultation. 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 He had a history of infantile spasms. Treatment currently is with EPIDIOLEX and lacosamide. With escalation of the lacosamide there has been a reduction in seizure frequency. He is now having about 5 seizures per day, down from 10. This medication is well tolerated. JYOTI continues to make slow progress with development. He is still using a few single words. His comprehension for language is good. He is walking well. His handedness is not clear. No sleep concerns are expressed today.

## 2020-01-02 ENCOUNTER — PATIENT MESSAGE (OUTPATIENT)
Dept: FAMILY MEDICINE | Facility: CLINIC | Age: 35
End: 2020-01-02

## 2020-01-02 DIAGNOSIS — M25.50 ARTHRALGIA, UNSPECIFIED JOINT: ICD-10-CM

## 2020-01-02 DIAGNOSIS — M79.7 FIBROMYALGIA: ICD-10-CM

## 2020-01-02 RX ORDER — CYCLOBENZAPRINE HCL 5 MG
5 TABLET ORAL 3 TIMES DAILY PRN
COMMUNITY
End: 2020-01-02 | Stop reason: SDUPTHER

## 2020-01-02 NOTE — TELEPHONE ENCOUNTER
Last Office Visit Info:   The patient's last visit with Chirag Singh MD was on 7/2/2019.    The patient's last visit in current department was on Visit date not found.        Last CBC Results:   Lab Results   Component Value Date    WBC 16.74 (H) 12/17/2018    HGB 13.9 12/17/2018    HCT 41.4 12/17/2018     12/17/2018       Last CMP Results  Lab Results   Component Value Date     12/17/2018    K 3.5 12/17/2018     12/17/2018    CO2 22 (L) 12/17/2018    BUN 14 12/17/2018    CREATININE 0.78 12/17/2018    CALCIUM 10.2 12/17/2018    ALBUMIN 5.2 12/17/2018    AST 25 12/17/2018    ALT 31 12/17/2018       Last Lipids  No results found for: CHOL, TRIG, HDL, LDLCALC    Last A1C  Lab Results   Component Value Date    HGBA1C 5.6 06/17/2016       Last TSH  Lab Results   Component Value Date    TSH 5.530 (H) 12/17/2018         Current Med Refills  Medication List with Changes/Refills   Current Medications    ALBUTEROL (PROVENTIL) 2.5 MG /3 ML (0.083 %) NEBULIZER SOLUTION    Take 3 mLs (2.5 mg total) by nebulization every 6 (six) hours as needed for Wheezing. Rescue       Start Date: 6/5/2018  End Date: 6/5/2019    BUPRENORPHINE HCL (BELBUCA) 150 MCG FILM    Place 150 mcg inside cheek 2 (two) times daily.       Start Date: 11/21/2018End Date: --    BUSPIRONE (BUSPAR) 5 MG TAB    Take 1 tablet (5 mg total) by mouth 2 (two) times daily.       Start Date: 1/15/2019 End Date: 1/15/2020    BUTALBITAL-ACETAMINOPHEN-CAFFEINE -40 MG (FIORICET, ESGIC) -40 MG PER TABLET    TK 1 T PO Q 6 HOURS AS NEEDED FOR HEADACHES       Start Date: 1/15/2019 End Date: --    CETIRIZINE (ZYRTEC) 10 MG TABLET    TK 1 T PO QD       Start Date: 12/21/2018End Date: --    CROMOLYN (GASTROCROM) 100 MG/5 ML SOLUTION    Take 200 mg by mouth.       Start Date: 8/10/2018 End Date: --    DICLOFENAC SODIUM 1 % GEL    APPLY 2 GRAMS EXTERNALLY TO THE AFFECTED AREA FOUR TIMES DAILY       Start Date: 3/23/2018 End Date: --    MODAFINIL  (PROVIGIL) 100 MG TAB    TAKE 2 TABLETS BY MOUTH EVERY DAY       Start Date: 7/2/2019  End Date: --    MONTELUKAST (SINGULAIR) 10 MG TABLET    Take 10 mg by mouth once daily.        Start Date: 3/30/2018 End Date: --    PANTOPRAZOLE (PROTONIX) 40 MG TABLET    Take 1 tablet (40 mg total) by mouth once daily.       Start Date: 11/21/2018End Date: --    QUETIAPINE (SEROQUEL) 25 MG TAB    TAKE 1 TO 4 TABLETS(25  MG) BY MOUTH EVERY NIGHT AS NEEDED       Start Date: 7/3/2019  End Date: --    RANITIDINE (ZANTAC) 150 MG TABLET    Take 150 mg by mouth.       Start Date: --        End Date: --    SUCRALFATE (CARAFATE) 100 MG/ML SUSPENSION    Take 10 mLs (1 g total) by mouth 4 (four) times daily.       Start Date: 10/16/2019End Date: --    TIZANIDINE (ZANAFLEX) 4 MG TABLET    Take 1 tablet (4 mg total) by mouth every 6 (six) hours as needed.       Start Date: 6/5/2019  End Date: --    TRAMADOL (ULTRAM) 50 MG TABLET    TAKE 1 TABLET BY MOUTH EVERY 8 HOURS AS NEEDED       Start Date: 6/5/2019  End Date: --    VILAZODONE (VIIBRYD) 40 MG TAB TABLET    Take 1 tablet (40 mg total) by mouth once daily.       Start Date: 1/15/2019 End Date: 1/15/2020    WALKER MISC    Use daily for safety, rollator walker       Start Date: 4/7/2018  End Date: --    WHEELCHAIR RIVER    Travel wheel chair, 1 hour per day       Start Date: 4/2/2018  End Date: --

## 2020-01-07 RX ORDER — CYCLOBENZAPRINE HCL 5 MG
5 TABLET ORAL 3 TIMES DAILY PRN
Qty: 30 TABLET | Refills: 0 | Status: SHIPPED | OUTPATIENT
Start: 2020-01-07 | End: 2020-07-28 | Stop reason: SDUPTHER

## 2020-01-07 RX ORDER — TRAMADOL HYDROCHLORIDE 50 MG/1
TABLET ORAL
Qty: 270 TABLET | Refills: 1 | Status: SHIPPED | OUTPATIENT
Start: 2020-01-07 | End: 2020-08-21 | Stop reason: SDUPTHER

## 2020-01-10 ENCOUNTER — OFFICE VISIT (OUTPATIENT)
Dept: FAMILY MEDICINE | Facility: CLINIC | Age: 35
End: 2020-01-10
Payer: COMMERCIAL

## 2020-01-10 VITALS
RESPIRATION RATE: 18 BRPM | HEART RATE: 98 BPM | OXYGEN SATURATION: 99 % | WEIGHT: 158.31 LBS | SYSTOLIC BLOOD PRESSURE: 100 MMHG | BODY MASS INDEX: 27.03 KG/M2 | HEIGHT: 64 IN | DIASTOLIC BLOOD PRESSURE: 70 MMHG | TEMPERATURE: 98 F

## 2020-01-10 DIAGNOSIS — D89.40 MAST CELL ACTIVATION SYNDROME: ICD-10-CM

## 2020-01-10 DIAGNOSIS — M25.50 ARTHRALGIA, UNSPECIFIED JOINT: ICD-10-CM

## 2020-01-10 DIAGNOSIS — M79.10 MYALGIA: ICD-10-CM

## 2020-01-10 DIAGNOSIS — K31.84 GASTROPARESIS: Primary | ICD-10-CM

## 2020-01-10 DIAGNOSIS — G47.30 SLEEP APNEA, UNSPECIFIED TYPE: ICD-10-CM

## 2020-01-10 DIAGNOSIS — M77.11 RIGHT LATERAL EPICONDYLITIS: ICD-10-CM

## 2020-01-10 PROCEDURE — 3008F BODY MASS INDEX DOCD: CPT | Mod: CPTII,S$GLB,, | Performed by: FAMILY MEDICINE

## 2020-01-10 PROCEDURE — 3078F PR MOST RECENT DIASTOLIC BLOOD PRESSURE < 80 MM HG: ICD-10-PCS | Mod: CPTII,S$GLB,, | Performed by: FAMILY MEDICINE

## 2020-01-10 PROCEDURE — 3078F DIAST BP <80 MM HG: CPT | Mod: CPTII,S$GLB,, | Performed by: FAMILY MEDICINE

## 2020-01-10 PROCEDURE — 3008F PR BODY MASS INDEX (BMI) DOCUMENTED: ICD-10-PCS | Mod: CPTII,S$GLB,, | Performed by: FAMILY MEDICINE

## 2020-01-10 PROCEDURE — 3074F SYST BP LT 130 MM HG: CPT | Mod: CPTII,S$GLB,, | Performed by: FAMILY MEDICINE

## 2020-01-10 PROCEDURE — 20552 NJX 1/MLT TRIGGER POINT 1/2: CPT | Mod: S$GLB,,, | Performed by: FAMILY MEDICINE

## 2020-01-10 PROCEDURE — 3074F PR MOST RECENT SYSTOLIC BLOOD PRESSURE < 130 MM HG: ICD-10-PCS | Mod: CPTII,S$GLB,, | Performed by: FAMILY MEDICINE

## 2020-01-10 PROCEDURE — 99999 PR PBB SHADOW E&M-EST. PATIENT-LVL III: ICD-10-PCS | Mod: PBBFAC,,, | Performed by: FAMILY MEDICINE

## 2020-01-10 PROCEDURE — 20552 PR INJECT TRIGGER POINT, 1 OR 2: ICD-10-PCS | Mod: S$GLB,,, | Performed by: FAMILY MEDICINE

## 2020-01-10 PROCEDURE — 99999 PR PBB SHADOW E&M-EST. PATIENT-LVL III: CPT | Mod: PBBFAC,,, | Performed by: FAMILY MEDICINE

## 2020-01-10 PROCEDURE — 99215 OFFICE O/P EST HI 40 MIN: CPT | Mod: 25,S$GLB,, | Performed by: FAMILY MEDICINE

## 2020-01-10 PROCEDURE — 99215 PR OFFICE/OUTPT VISIT, EST, LEVL V, 40-54 MIN: ICD-10-PCS | Mod: 25,S$GLB,, | Performed by: FAMILY MEDICINE

## 2020-01-10 RX ORDER — MODAFINIL 200 MG/1
200 TABLET ORAL DAILY
Qty: 30 TABLET | Refills: 1 | Status: SHIPPED | OUTPATIENT
Start: 2020-01-10 | End: 2020-01-15 | Stop reason: SDUPTHER

## 2020-01-10 RX ORDER — TRIAMCINOLONE ACETONIDE 40 MG/ML
40 INJECTION, SUSPENSION INTRA-ARTICULAR; INTRAMUSCULAR
Status: DISCONTINUED | OUTPATIENT
Start: 2020-01-10 | End: 2020-01-12 | Stop reason: HOSPADM

## 2020-01-10 RX ORDER — TRAMADOL HYDROCHLORIDE 300 MG/1
300 TABLET, EXTENDED RELEASE ORAL DAILY
Qty: 30 TABLET | Refills: 0 | Status: SHIPPED | OUTPATIENT
Start: 2020-01-10 | End: 2020-02-13

## 2020-01-10 RX ADMIN — TRIAMCINOLONE ACETONIDE 40 MG: 40 INJECTION, SUSPENSION INTRA-ARTICULAR; INTRAMUSCULAR at 11:01

## 2020-01-10 NOTE — PROCEDURES
R lateral epicondyleIntermediate Joint Aspiration/Injection  Date/Time: 1/10/2020 11:45 AM  Performed by: Chirag Singh MD  Authorized by: Chirag Singh MD     Consent Done?: Yes (Written)  Indications:  Pain  Site marked: The procedure site was marked    Timeout: Prior to procedure the correct patient, procedure, and site was verified      Location:  Elbow  Prep: Patient was prepped and draped in usual sterile fashion    Needle size:  22 G  Approach:  Anterolateral  Medications:  40 mg triamcinolone acetonide 40 mg/mL  Patient tolerance:  Patient tolerated the procedure well with no immediate complications

## 2020-01-10 NOTE — PROCEDURES
Procedures   Patient informed and written consent obtained after discussing R/B/A, all patients questions answered, patient voiced understanding.  Indications: myalgia  Pertinent lab values: n/a  Type of anesthesia: topical  Procedure:  Patient placed in seated, area prepped in sterile fashion, trigger points identified left paraspinus cervical muscle and trapezius, 40 mg of kenalog and 9 cc of lidocaine injected into trigger points.    Complications: none  Estimated blood loss: none  Patient tolerated procedure well.  Given wound care instructions and instructions on activity and when to return to full activity.

## 2020-01-13 NOTE — PROGRESS NOTES
HISTORY OF PRESENT ILLNESS:  Krystal Wren is a 34 y.o. female who presents to the clinic today for Medication Refill  .     Chronic problems that are multiple.  She is using her medications well.  Still struggles with 24/7 pain.  The tramadol helps  She is losing weight because of the gastroparesis.  She is seeing the specialists.  Time otherwise spent with procedures and cousneling  Per problem list.      PAST MEDICAL HISTORY:  Past Medical History:   Diagnosis Date    Allergy     Anxiety     Asthma     Depression     Fibromyalgia     Fibromyalgia 2000    Gastroparesis     Hyperlipidemia     Hypertension     Irritable bowel syndrome     Migraine     Recurrent upper respiratory infection (URI)     Suicidal behavior     Urticaria        PAST SURGICAL HISTORY:  Past Surgical History:   Procedure Laterality Date     SECTION      COLONOSCOPY      ESOPHAGOGASTRODUODENOSCOPY      ESOPHAGOGASTRODUODENOSCOPY N/A 2019    Procedure: ESOPHAGOGASTRODUODENOSCOPY (EGD);  Surgeon: David Marin MD;  Location: [facility];  Service: Endoscopy;  Laterality: N/A;  with botox    HYSTERECTOMY      NOSE SURGERY      SINUS SURGERY         SOCIAL HISTORY:  Social History     Socioeconomic History    Marital status:      Spouse name: Elian    Number of children: 1    Years of education: Not on file    Highest education level: Not on file   Occupational History    Occupation: [employer]   Social Needs    Financial resource strain: Somewhat hard    Food insecurity:     Worry: Sometimes true     Inability: Sometimes true    Transportation needs:     Medical: Yes     Non-medical: Yes   Tobacco Use    Smoking status: Current Every Day Smoker     Packs/day: 0.50    Smokeless tobacco: Never Used    Tobacco comment:    Substance and Sexual Activity    Alcohol use: Yes     Frequency: Never     Drinks per session: Patient refused     Binge frequency: Patient refused     Comment:  socially     Drug use: No    Sexual activity: Yes     Partners: Male     Birth control/protection: OCP   Lifestyle    Physical activity:     Days per week: Not on file     Minutes per session: Not on file    Stress: Rather much   Relationships    Social connections:     Talks on phone: More than three times a week     Gets together: Patient refused     Attends [employer]ism service: Not on file     Active member of club or organization: Yes     Attends meetings of clubs or organizations: More than 4 times per year     Relationship status:    Other Topics Concern    Not on file   Social History Narrative    Pt: AMPARO    : Elian - works [employer]    One 9 year old daughter - IOL at 39wks -> FTP -> C/S       FAMILY HISTORY:  Family History   Adopted: Yes   Problem Relation Age of Onset    Hypertension Brother     Asthma Brother     Allergies Brother     Diabetes Sister     Hypertension Sister     Allergies Sister     Psoriasis Daughter     Psoriasis Son     Allergies Son     Lupus Sister         type unknown    Allergies Sister     Rheum arthritis Sister         takes Humira    Breast cancer Neg Hx     Colon cancer Neg Hx     Ovarian cancer Neg Hx     Inflammatory bowel disease Neg Hx     Thyroid disease Neg Hx        ALLERGIES AND MEDICATIONS: updated and reviewed.  Review of patient's allergies indicates:   Allergen Reactions    Ceclor [cefaclor] Hives    Cymbalta [duloxetine]      suicidal    Elavil [amitriptyline]      fatigue    Lyrica [pregabalin]      edema    Neurontin [gabapentin]      Confusion, brain fog     Medication List with Changes/Refills   New Medications    TRAMADOL (ULTRAM-ER) 300 MG TB24    Take 1 tablet (300 mg total) by mouth once daily.   Current Medications    ALBUTEROL (PROVENTIL) 2.5 MG /3 ML (0.083 %) NEBULIZER SOLUTION    Take 3 mLs (2.5 mg total) by nebulization every 6 (six) hours as needed for Wheezing. Rescue     BUTALBITAL-ACETAMINOPHEN-CAFFEINE -40 MG (FIORICET, ESGIC) -40 MG PER TABLET    TK 1 T PO Q 6 HOURS AS NEEDED FOR HEADACHES    CETIRIZINE (ZYRTEC) 10 MG TABLET    TK 1 T PO QD    CROMOLYN (GASTROCROM) 100 MG/5 ML SOLUTION    Take 200 mg by mouth.    CYCLOBENZAPRINE (FLEXERIL) 5 MG TABLET    Take 1 tablet (5 mg total) by mouth 3 (three) times daily as needed.    MONTELUKAST (SINGULAIR) 10 MG TABLET    Take 10 mg by mouth once daily.     RANITIDINE (ZANTAC) 150 MG TABLET    Take 150 mg by mouth.    SUCRALFATE (CARAFATE) 100 MG/ML SUSPENSION    Take 10 mLs (1 g total) by mouth 4 (four) times daily.    TIZANIDINE (ZANAFLEX) 4 MG TABLET    Take 1 tablet (4 mg total) by mouth every 6 (six) hours as needed.    TRAMADOL (ULTRAM) 50 MG TABLET    TAKE 1 TABLET BY MOUTH EVERY 8 HOURS AS NEEDED    WALKER MISC    Use daily for safety, rollator walker    WHEELCHAIR RIVER    Travel wheel chair, 1 hour per day   Changed and/or Refilled Medications    Modified Medication Previous Medication    MODAFINIL (PROVIGIL) 200 MG TAB modafinil (PROVIGIL) 100 MG Tab       Take 1 tablet (200 mg total) by mouth once daily. TAKE 2 TABLETS BY MOUTH EVERY DAY    TAKE 2 TABLETS BY MOUTH EVERY DAY   Discontinued Medications    BUPRENORPHINE HCL (BELBUCA) 150 MCG FILM    Place 150 mcg inside cheek 2 (two) times daily.    BUSPIRONE (BUSPAR) 5 MG TAB    Take 1 tablet (5 mg total) by mouth 2 (two) times daily.    DICLOFENAC SODIUM 1 % GEL    APPLY 2 GRAMS EXTERNALLY TO THE AFFECTED AREA FOUR TIMES DAILY    PANTOPRAZOLE (PROTONIX) 40 MG TABLET    Take 1 tablet (40 mg total) by mouth once daily.    QUETIAPINE (SEROQUEL) 25 MG TAB    TAKE 1 TO 4 TABLETS(25  MG) BY MOUTH EVERY NIGHT AS NEEDED    VILAZODONE (VIIBRYD) 40 MG TAB TABLET    Take 1 tablet (40 mg total) by mouth once daily.          CARE TEAM:  Patient Care Team:  Chirag Singh MD as PCP - General (Family Medicine)  Earl Min MD as Consulting Physician (Internal  "Medicine)  VILMA Flores MD as Obstetrician (Obstetrics)  Annemarie Feldman MA as Care Coordinator         REVIEW OF SYSTEMS:time spent in cousneling  Review of Systems      PHYSICAL EXAM:   Vitals:    01/10/20 1203   BP: 100/70   Pulse: 98   Resp: 18   Temp: 98 °F (36.7 °C)     Weight: 71.8 kg (158 lb 4.6 oz)   Height: 5' 4" (162.6 cm)   Body mass index is 27.17 kg/m².     General appearance - oriented to person, place, and time, normal appearing weight, in mild to moderate distress and chronically ill appearing  Mental status - alert, oriented to person, place, and time  Eyes - pupils equal and reactive, extraocular eye movements intact  Ears -   Nose -   Mouth -   Neck - supple, no significant adenopathy, posterior spinal muscles are very tender  Lymphatics - no palpable lymphadenopathy, no hepatosplenomegaly  Chest - clear to auscultation, no wheezes, rales or rhonchi, symmetric air entry  Heart - normal rate, regular rhythm, normal S1, S2, no murmurs, rubs, clicks or gallops  Abdomen - mild distention  Back exam - increased muscle tone  Neurological - no focal deficits noted  Musculoskeletal -   Skin -        Medication List with Changes/Refills   New Medications    TRAMADOL (ULTRAM-ER) 300 MG TB24    Take 1 tablet (300 mg total) by mouth once daily.   Current Medications    ALBUTEROL (PROVENTIL) 2.5 MG /3 ML (0.083 %) NEBULIZER SOLUTION    Take 3 mLs (2.5 mg total) by nebulization every 6 (six) hours as needed for Wheezing. Rescue    BUTALBITAL-ACETAMINOPHEN-CAFFEINE -40 MG (FIORICET, ESGIC) -40 MG PER TABLET    TK 1 T PO Q 6 HOURS AS NEEDED FOR HEADACHES    CETIRIZINE (ZYRTEC) 10 MG TABLET    TK 1 T PO QD    CROMOLYN (GASTROCROM) 100 MG/5 ML SOLUTION    Take 200 mg by mouth.    CYCLOBENZAPRINE (FLEXERIL) 5 MG TABLET    Take 1 tablet (5 mg total) by mouth 3 (three) times daily as needed.    MONTELUKAST (SINGULAIR) 10 MG TABLET    Take 10 mg by mouth once daily.     RANITIDINE " (ZANTAC) 150 MG TABLET    Take 150 mg by mouth.    SUCRALFATE (CARAFATE) 100 MG/ML SUSPENSION    Take 10 mLs (1 g total) by mouth 4 (four) times daily.    TIZANIDINE (ZANAFLEX) 4 MG TABLET    Take 1 tablet (4 mg total) by mouth every 6 (six) hours as needed.    TRAMADOL (ULTRAM) 50 MG TABLET    TAKE 1 TABLET BY MOUTH EVERY 8 HOURS AS NEEDED    WALKER MISC    Use daily for safety, rollator walker    WHEELCHAIR RIVER    Travel wheel chair, 1 hour per day   Changed and/or Refilled Medications    Modified Medication Previous Medication    MODAFINIL (PROVIGIL) 200 MG TAB modafinil (PROVIGIL) 100 MG Tab       Take 1 tablet (200 mg total) by mouth once daily. TAKE 2 TABLETS BY MOUTH EVERY DAY    TAKE 2 TABLETS BY MOUTH EVERY DAY   Discontinued Medications    BUPRENORPHINE HCL (BELBUCA) 150 MCG FILM    Place 150 mcg inside cheek 2 (two) times daily.    BUSPIRONE (BUSPAR) 5 MG TAB    Take 1 tablet (5 mg total) by mouth 2 (two) times daily.    DICLOFENAC SODIUM 1 % GEL    APPLY 2 GRAMS EXTERNALLY TO THE AFFECTED AREA FOUR TIMES DAILY    PANTOPRAZOLE (PROTONIX) 40 MG TABLET    Take 1 tablet (40 mg total) by mouth once daily.    QUETIAPINE (SEROQUEL) 25 MG TAB    TAKE 1 TO 4 TABLETS(25  MG) BY MOUTH EVERY NIGHT AS NEEDED    VILAZODONE (VIIBRYD) 40 MG TAB TABLET    Take 1 tablet (40 mg total) by mouth once daily.         ASSESSMENT AND PLAN:    Problem List Items Addressed This Visit     Sleep apnea    Relevant Medications    modafinil (PROVIGIL) 200 MG Tab    Right lateral epicondylitis    Relevant Medications    triamcinolone acetonide injection 40 mg    Other Relevant Orders    Intermediate Joint Aspiration/Injection (Completed)    Mast cell activation syndrome    Overview       04/02/2018  noted         Relevant Medications    modafinil (PROVIGIL) 200 MG Tab    traMADol (ULTRAM-ER) 300 MG Tb24    Joint pain    Relevant Medications    traMADol (ULTRAM-ER) 300 MG Tb24    Gastroparesis - Primary    Relevant Medications     traMADol (ULTRAM-ER) 300 MG Tb24      Other Visit Diagnoses     Myalgia        Relevant Orders    Trigger Point Injection (Completed)          Spent >40 (69) minutes with the patient with 1/2 time in face to face counseling about the above.    No future appointments.    No follow-ups on file. or sooner as needed.

## 2020-01-15 ENCOUNTER — TELEPHONE (OUTPATIENT)
Dept: FAMILY MEDICINE | Facility: CLINIC | Age: 35
End: 2020-01-15

## 2020-01-15 DIAGNOSIS — G47.30 SLEEP APNEA, UNSPECIFIED TYPE: ICD-10-CM

## 2020-01-15 DIAGNOSIS — D89.40 MAST CELL ACTIVATION SYNDROME: ICD-10-CM

## 2020-01-15 RX ORDER — MODAFINIL 200 MG/1
200 TABLET ORAL DAILY
Qty: 30 TABLET | Refills: 1 | Status: SHIPPED | OUTPATIENT
Start: 2020-01-15 | End: 2020-02-14

## 2020-01-17 ENCOUNTER — TELEPHONE (OUTPATIENT)
Dept: FAMILY MEDICINE | Facility: CLINIC | Age: 35
End: 2020-01-17

## 2020-01-17 DIAGNOSIS — D89.40 MAST CELL ACTIVATION SYNDROME: ICD-10-CM

## 2020-01-17 DIAGNOSIS — G47.30 SLEEP APNEA, UNSPECIFIED TYPE: ICD-10-CM

## 2020-01-17 RX ORDER — MODAFINIL 200 MG/1
200 TABLET ORAL DAILY
Qty: 30 TABLET | Refills: 1 | Status: CANCELLED | OUTPATIENT
Start: 2020-01-17 | End: 2020-02-16

## 2020-01-17 NOTE — TELEPHONE ENCOUNTER
Last Office Visit Info:   The patient's last visit with Chirag Singh MD was on 1/10/2020.    The patient's last visit in current department was on 1/10/2020.        Last CBC Results:   Lab Results   Component Value Date    WBC 16.74 (H) 12/17/2018    HGB 13.9 12/17/2018    HCT 41.4 12/17/2018     12/17/2018       Last CMP Results  Lab Results   Component Value Date     12/17/2018    K 3.5 12/17/2018     12/17/2018    CO2 22 (L) 12/17/2018    BUN 14 12/17/2018    CREATININE 0.78 12/17/2018    CALCIUM 10.2 12/17/2018    ALBUMIN 5.2 12/17/2018    AST 25 12/17/2018    ALT 31 12/17/2018       Last Lipids  No results found for: CHOL, TRIG, HDL, LDLCALC    Last A1C  Lab Results   Component Value Date    HGBA1C 5.6 06/17/2016       Last TSH  Lab Results   Component Value Date    TSH 5.530 (H) 12/17/2018         Current Med Refills  Medication List with Changes/Refills   Current Medications    ALBUTEROL (PROVENTIL) 2.5 MG /3 ML (0.083 %) NEBULIZER SOLUTION    Take 3 mLs (2.5 mg total) by nebulization every 6 (six) hours as needed for Wheezing. Rescue       Start Date: 6/5/2018  End Date: 6/5/2019    BUTALBITAL-ACETAMINOPHEN-CAFFEINE -40 MG (FIORICET, ESGIC) -40 MG PER TABLET    TK 1 T PO Q 6 HOURS AS NEEDED FOR HEADACHES       Start Date: 1/15/2019 End Date: --    CETIRIZINE (ZYRTEC) 10 MG TABLET    TK 1 T PO QD       Start Date: 12/21/2018End Date: --    CROMOLYN (GASTROCROM) 100 MG/5 ML SOLUTION    Take 200 mg by mouth.       Start Date: 8/10/2018 End Date: --    CYCLOBENZAPRINE (FLEXERIL) 5 MG TABLET    Take 1 tablet (5 mg total) by mouth 3 (three) times daily as needed.       Start Date: 1/7/2020  End Date: --    MODAFINIL (PROVIGIL) 200 MG TAB    Take 1 tablet (200 mg total) by mouth once daily.       Start Date: 1/15/2020 End Date: 2/14/2020    MONTELUKAST (SINGULAIR) 10 MG TABLET    Take 10 mg by mouth once daily.        Start Date: 3/30/2018 End Date: --    RANITIDINE (ZANTAC) 150 MG  TABLET    Take 150 mg by mouth.       Start Date: --        End Date: --    SUCRALFATE (CARAFATE) 100 MG/ML SUSPENSION    Take 10 mLs (1 g total) by mouth 4 (four) times daily.       Start Date: 10/16/2019End Date: --    TIZANIDINE (ZANAFLEX) 4 MG TABLET    Take 1 tablet (4 mg total) by mouth every 6 (six) hours as needed.       Start Date: 6/5/2019  End Date: --    TRAMADOL (ULTRAM) 50 MG TABLET    TAKE 1 TABLET BY MOUTH EVERY 8 HOURS AS NEEDED       Start Date: 1/7/2020  End Date: --    TRAMADOL (ULTRAM-ER) 300 MG TB24    Take 1 tablet (300 mg total) by mouth once daily.       Start Date: 1/10/2020 End Date: 2/13/2020    WALKER MISC    Use daily for safety, rollator walker       Start Date: 4/7/2018  End Date: --    WHEELCHAIR RIVER    Travel wheel chair, 1 hour per day       Start Date: 4/2/2018  End Date: --       Order(s) placed per written order guidelines:     Please advise.

## 2020-01-17 NOTE — TELEPHONE ENCOUNTER
Received PA from [facility] for Modafinil 200MG tablets, Spoke with patient letting her know meds was approved andseh may go to their Pharmacy to  their meds. Pt verbally understands.       Patient was prescribed Modafinil 200MG tablets   Pharmacy is stating that med need's PA, sent to [facility] for PA .

## 2020-01-23 ENCOUNTER — HOSPITAL ENCOUNTER (EMERGENCY)
Facility: HOSPITAL | Age: 35
Discharge: HOME | End: 2020-01-23
Attending: EMERGENCY MEDICINE
Payer: COMMERCIAL

## 2020-01-23 VITALS
WEIGHT: 160 LBS | BODY MASS INDEX: 27.31 KG/M2 | TEMPERATURE: 99 F | DIASTOLIC BLOOD PRESSURE: 98 MMHG | HEART RATE: 89 BPM | RESPIRATION RATE: 18 BRPM | OXYGEN SATURATION: 99 % | SYSTOLIC BLOOD PRESSURE: 134 MMHG | HEIGHT: 64 IN

## 2020-01-23 DIAGNOSIS — K31.84 GASTROPARESIS: ICD-10-CM

## 2020-01-23 DIAGNOSIS — K64.9 HEMORRHOIDS, UNSPECIFIED HEMORRHOID TYPE: ICD-10-CM

## 2020-01-23 DIAGNOSIS — R10.9 ABDOMINAL PAIN: ICD-10-CM

## 2020-01-23 DIAGNOSIS — K59.00 CONSTIPATION: Primary | ICD-10-CM

## 2020-01-23 LAB
ALBUMIN SERPL BCP-MCNC: 4.7 G/DL (ref 3.5–5.2)
ALP SERPL-CCNC: 64 U/L (ref 55–135)
ALT SERPL W/O P-5'-P-CCNC: 18 U/L (ref 10–44)
ANION GAP SERPL CALC-SCNC: 11 MMOL/L (ref 8–16)
AST SERPL-CCNC: 19 U/L (ref 10–40)
BASOPHILS # BLD AUTO: 0.01 K/UL (ref 0–0.2)
BASOPHILS NFR BLD: 0.1 % (ref 0–1.9)
BILIRUB SERPL-MCNC: 0.6 MG/DL (ref 0.1–1)
BILIRUB UR QL STRIP: NEGATIVE
BUN SERPL-MCNC: 8 MG/DL (ref 6–20)
CALCIUM SERPL-MCNC: 10.2 MG/DL (ref 8.7–10.5)
CHLORIDE SERPL-SCNC: 104 MMOL/L (ref 95–110)
CLARITY UR: CLEAR
CO2 SERPL-SCNC: 26 MMOL/L (ref 23–29)
COLOR UR: YELLOW
CREAT SERPL-MCNC: 0.6 MG/DL (ref 0.5–1.4)
DIFFERENTIAL METHOD: ABNORMAL
EOSINOPHIL # BLD AUTO: 0.1 K/UL (ref 0–0.5)
EOSINOPHIL NFR BLD: 0.5 % (ref 0–8)
ERYTHROCYTE [DISTWIDTH] IN BLOOD BY AUTOMATED COUNT: 12.8 % (ref 11.5–14.5)
EST. GFR  (AFRICAN AMERICAN): >60 ML/MIN/1.73 M^2
EST. GFR  (NON AFRICAN AMERICAN): >60 ML/MIN/1.73 M^2
GLUCOSE SERPL-MCNC: 108 MG/DL (ref 70–110)
GLUCOSE UR QL STRIP: NEGATIVE
HCT VFR BLD AUTO: 43.1 % (ref 37–48.5)
HGB BLD-MCNC: 14.2 G/DL (ref 12–16)
HGB UR QL STRIP: NEGATIVE
KETONES UR QL STRIP: NEGATIVE
LEUKOCYTE ESTERASE UR QL STRIP: NEGATIVE
LIPASE SERPL-CCNC: 9 U/L (ref 4–60)
LYMPHOCYTES # BLD AUTO: 1.5 K/UL (ref 1–4.8)
LYMPHOCYTES NFR BLD: 11 % (ref 18–48)
MCH RBC QN AUTO: 29.4 PG (ref 27–31)
MCHC RBC AUTO-ENTMCNC: 32.9 G/DL (ref 32–36)
MCV RBC AUTO: 89 FL (ref 82–98)
MONOCYTES # BLD AUTO: 0.8 K/UL (ref 0.3–1)
MONOCYTES NFR BLD: 5.5 % (ref 4–15)
NEUTROPHILS # BLD AUTO: 11.4 K/UL (ref 1.8–7.7)
NEUTROPHILS NFR BLD: 82.9 % (ref 38–73)
NITRITE UR QL STRIP: NEGATIVE
PH UR STRIP: 8 [PH] (ref 5–8)
PLATELET # BLD AUTO: 224 K/UL (ref 150–350)
PMV BLD AUTO: 11.1 FL (ref 9.2–12.9)
POTASSIUM SERPL-SCNC: 3.9 MMOL/L (ref 3.5–5.1)
PROT SERPL-MCNC: 8.3 G/DL (ref 6–8.4)
PROT UR QL STRIP: NEGATIVE
RBC # BLD AUTO: 4.83 M/UL (ref 4–5.4)
SODIUM SERPL-SCNC: 141 MMOL/L (ref 136–145)
SP GR UR STRIP: 1.01 (ref 1–1.03)
URN SPEC COLLECT METH UR: NORMAL
UROBILINOGEN UR STRIP-ACNC: NEGATIVE EU/DL
WBC # BLD AUTO: 13.77 K/UL (ref 3.9–12.7)

## 2020-01-23 PROCEDURE — 63600175 PHARM REV CODE 636 W HCPCS: Performed by: NURSE PRACTITIONER

## 2020-01-23 PROCEDURE — 96372 THER/PROPH/DIAG INJ SC/IM: CPT

## 2020-01-23 PROCEDURE — 80053 COMPREHEN METABOLIC PANEL: CPT

## 2020-01-23 PROCEDURE — 83690 ASSAY OF LIPASE: CPT

## 2020-01-23 PROCEDURE — 99284 EMERGENCY DEPT VISIT MOD MDM: CPT | Mod: 25

## 2020-01-23 PROCEDURE — 96360 HYDRATION IV INFUSION INIT: CPT

## 2020-01-23 PROCEDURE — 81003 URINALYSIS AUTO W/O SCOPE: CPT

## 2020-01-23 PROCEDURE — 85025 COMPLETE CBC W/AUTO DIFF WBC: CPT

## 2020-01-23 PROCEDURE — 25000003 PHARM REV CODE 250: Performed by: NURSE PRACTITIONER

## 2020-01-23 RX ORDER — ONDANSETRON 2 MG/ML
4 INJECTION INTRAMUSCULAR; INTRAVENOUS
Status: COMPLETED | OUTPATIENT
Start: 2020-01-23 | End: 2020-01-23

## 2020-01-23 RX ORDER — HYDROCORTISONE 25 MG/G
CREAM TOPICAL 2 TIMES DAILY
Status: COMPLETED | OUTPATIENT
Start: 2020-01-23 | End: 2020-01-23

## 2020-01-23 RX ORDER — SYRING-NEEDL,DISP,INSUL,0.3 ML 29 G X1/2"
296 SYRINGE, EMPTY DISPOSABLE MISCELLANEOUS
Status: COMPLETED | OUTPATIENT
Start: 2020-01-23 | End: 2020-01-23

## 2020-01-23 RX ORDER — HYDROCORTISONE 25 MG/G
CREAM TOPICAL 2 TIMES DAILY
Qty: 28 G | Refills: 0 | Status: SHIPPED | OUTPATIENT
Start: 2020-01-23 | End: 2020-01-28

## 2020-01-23 RX ORDER — PSEUDOEPHEDRINE/ACETAMINOPHEN 30MG-500MG
100 TABLET ORAL
Status: DISCONTINUED | OUTPATIENT
Start: 2020-01-23 | End: 2020-01-23

## 2020-01-23 RX ORDER — PSEUDOEPHEDRINE/ACETAMINOPHEN 30MG-500MG
100 TABLET ORAL
Status: COMPLETED | OUTPATIENT
Start: 2020-01-23 | End: 2020-01-23

## 2020-01-23 RX ORDER — SYRING-NEEDL,DISP,INSUL,0.3 ML 29 G X1/2"
296 SYRINGE, EMPTY DISPOSABLE MISCELLANEOUS
Status: DISCONTINUED | OUTPATIENT
Start: 2020-01-23 | End: 2020-01-23

## 2020-01-23 RX ADMIN — MAGNESIUM CITRATE 296 ML: 1.75 LIQUID ORAL at 10:01

## 2020-01-23 RX ADMIN — SODIUM CHLORIDE 500 ML: 0.9 INJECTION, SOLUTION INTRAVENOUS at 10:01

## 2020-01-23 RX ADMIN — Medication 100 ML: at 10:01

## 2020-01-23 RX ADMIN — ONDANSETRON 4 MG: 2 INJECTION INTRAMUSCULAR; INTRAVENOUS at 09:01

## 2020-01-23 RX ADMIN — METHYLNALTREXONE BROMIDE 12 MG: 12 INJECTION, SOLUTION SUBCUTANEOUS at 09:01

## 2020-01-23 RX ADMIN — HYDROCORTISONE: 25 CREAM TOPICAL at 11:01

## 2020-01-23 NOTE — ED PROVIDER NOTES
Encounter Date: 2020       History     Chief Complaint   Patient presents with    Abdominal Pain     Pt reports constipation for roughly a week. Pt reports hx gastroparesis. Pt states pain began this AM around midnight, reports one episode of emesis with nausea.     34-year-old female with fibromyalgia, hypertension, irritable bowel syndrome, migraine, depression, hyperlipidemia and gastroparesis which presents the emergency room with abdominal pain that began around midnight.  Patient states that she has not had a bowel movement in a week and suffers with chronic constipation.  She was unable to complete her home regimen for her constipation secondary to needing to take care of her daughter.  Patient denies any nausea/vomiting, chest pain or shortness of breath.    The history is provided by the patient.     Review of patient's allergies indicates:   Allergen Reactions    Ceclor [cefaclor] Hives    Cymbalta [duloxetine]      suicidal    Elavil [amitriptyline]      fatigue    Lyrica [pregabalin]      edema    Neurontin [gabapentin]      Confusion, brain fog     Past Medical History:   Diagnosis Date    Allergy     Anxiety     Asthma     Depression     Fibromyalgia     Fibromyalgia 2000    Gastroparesis     Hyperlipidemia     Hypertension     Irritable bowel syndrome     Migraine     Recurrent upper respiratory infection (URI)     Suicidal behavior     Urticaria      Past Surgical History:   Procedure Laterality Date     SECTION      COLONOSCOPY      ESOPHAGOGASTRODUODENOSCOPY      ESOPHAGOGASTRODUODENOSCOPY N/A 2019    Procedure: ESOPHAGOGASTRODUODENOSCOPY (EGD);  Surgeon: David Marin MD;  Location[facility];  Service: Endoscopy;  Laterality: N/A;  with botox    HYSTERECTOMY      NOSE SURGERY      SINUS SURGERY       Family History   Adopted: Yes   Problem Relation Age of Onset    Hypertension Brother     Asthma Brother     Allergies Brother     Diabetes Sister      Hypertension Sister     Allergies Sister     Psoriasis Daughter     Psoriasis Son     Allergies Son     Lupus Sister         type unknown    Allergies Sister     Rheum arthritis Sister         takes Humira    Breast cancer Neg Hx     Colon cancer Neg Hx     Ovarian cancer Neg Hx     Inflammatory bowel disease Neg Hx     Thyroid disease Neg Hx      Social History     Tobacco Use    Smoking status: Current Every Day Smoker     Packs/day: 0.50    Smokeless tobacco: Never Used    Tobacco comment:    Substance Use Topics    Alcohol use: Yes     Frequency: Never     Drinks per session: Patient refused     Binge frequency: Patient refused     Comment: socially     Drug use: No     Review of Systems   Constitutional: Negative for fever.   HENT: Negative for sore throat.    Respiratory: Negative for shortness of breath.    Cardiovascular: Negative for chest pain.   Gastrointestinal: Positive for abdominal pain and constipation. Negative for nausea and vomiting.   Genitourinary: Negative for dysuria.   Musculoskeletal: Negative for back pain.   Skin: Negative for rash.   Neurological: Negative for weakness.   Hematological: Does not bruise/bleed easily.   All other systems reviewed and are negative.    Physical Exam     Initial Vitals [01/23/20 0825]   BP Pulse Resp Temp SpO2   (!) 131/97 90 18 98 °F (36.7 °C) 100 %      MAP       --         Physical Exam    Nursing note and vitals reviewed.  Constitutional: She appears well-developed and well-nourished. She appears distressed (Mild secondary to pain).   HENT:   Head: Normocephalic and atraumatic.   Eyes: Conjunctivae and EOM are normal. Pupils are equal, round, and reactive to light.   Neck: Normal range of motion.   Cardiovascular: Normal rate, regular rhythm, normal heart sounds and intact distal pulses. Exam reveals no gallop and no friction rub.    No murmur heard.  Pulmonary/Chest: Breath sounds normal. No respiratory distress. She has no  wheezes. She has no rhonchi. She has no rales. She exhibits no tenderness.   Abdominal: Soft. Bowel sounds are normal. She exhibits no distension and no mass. There is generalized tenderness. There is no rebound and no guarding.   Genitourinary:   Genitourinary Comments: Moderate sized external hemorrhoids   Musculoskeletal: Normal range of motion. She exhibits no edema or tenderness.   Neurological: She is alert and oriented to person, place, and time. She has normal strength.       ED Course   Procedures  Labs Reviewed   CBC W/ AUTO DIFFERENTIAL - Abnormal; Notable for the following components:       Result Value    WBC 13.77 (*)     Gran # (ANC) 11.4 (*)     Gran% 82.9 (*)     Lymph% 11.0 (*)     All other components within normal limits   COMPREHENSIVE METABOLIC PANEL   LIPASE   URINALYSIS, REFLEX TO URINE CULTURE    Narrative:     Preferred Collection Type->Urine, Clean Catch          Imaging Results          X-Ray Abdomen AP 1 View (KUB) (Final result)  Result time 01/23/20 08:53:01    Final result by Jacinto Luis Jr., MD (01/23/20 08:53:01)                 Impression:      Rectal fecal impaction    Hepatomegaly      Electronically signed by: Jacinto Berrios  Date:    01/23/2020  Time:    08:53             Narrative:    EXAMINATION:  XR ABDOMEN AP 1 VIEW    CLINICAL HISTORY:  Unspecified abdominal pain    TECHNIQUE:  AP View(s) of the abdomen was performed.    COMPARISON:  None    FINDINGS:  Bowel gas pattern nonobstructive.  There is some mild rectal fecal impaction.  Bones unremarkable.  Hepatic shadow enlarged.                                 Medical Decision Making:   Initial Assessment:   34-year-old female with fibromyalgia, hypertension, irritable bowel syndrome, migraine, depression, hyperlipidemia and gastroparesis which presents the emergency room with abdominal pain that began around midnight.  Patient states that she has not had a bowel movement in a week and suffers with chronic  "constipation.  She was unable to complete her home regimen for her constipation secondary to needing to take care of her daughter.  Patient denies any nausea/vomiting, chest pain or shortness of breath.    Differential Diagnosis:   Gastroenteritis, gastritis, ulcer, cholecystitis, gallstones, pancreatitis, ileus, small bowel obstruction, appendicitis, constipation, hemorrhoids, gastroparesis  Clinical Tests:   Lab Tests: Ordered and Reviewed  The following lab test(s) were unremarkable: CBC, CMP, Urinalysis, UPT and Lipase  Radiological Study: Ordered and Reviewed  ED Management:  Patient examined noted to have generalized abdominal pain to the abdomen.  X-ray with a rectal impaction.  Patient was given Relistor as some of his constipation could be related to her tramadol use.  Patient also given a brown brown enema and was able to successfully have a bowel movement which improved her pain.  The patient is also having hemorrhoid pain and was given Anusol and talks while in the emergency room.  Patient will be discharged with Proctofoam and Anusol.  Patient advised to follow up with Dr. Marin for further evaluation of her chronic constipation and gastroparesis. Patient given strict return precautions and voiced understanding of all discharge instructions. Pt was stable at discharge.                        ED Course as of Jan 23 1149   Thu Jan 23, 2020   0840 BP(!): 131/97 [AT]   0840 Temp: 98 °F (36.7 °C) [AT]   0840 Temp src: Oral [AT]   0840 Pulse: 90 [AT]   0840 Resp: 18 [AT]   0840 SpO2: 100 % [AT]   0938 WBC(!): 13.77 [AT]   0942 Lipase: 9 [AT]   1133 Pt feeling much better after she had a bowel movement- she is still have pain from her hemorrhoids and is now having some "diarrhea cramps"    [AT]      ED Course User Index  [AT] MELONIE Stearns          Clinical Impression:       ICD-10-CM ICD-9-CM   1. Constipation K59.00 564.00   2. Abdominal pain R10.9 789.00   3. Hemorrhoids, unspecified hemorrhoid " type K64.9 455.6   4. Gastroparesis K31.84 536.3                             Lotus Redding, [facility]  01/23/20 1151

## 2020-01-23 NOTE — ED NOTES
Enema order complete pt tolerated moderately well. Noted hemorrhoids, provider informed. Bedside commode set up. Pt advised to use call bell if needed.  at bedside.

## 2020-01-23 NOTE — ED NOTES
"Pt states that we have taken care of her constipation but are not addressing her other issues. When asked to elaborate the patient states that she is a "complex patient". RN offered to request provider to bedside. Pt and family member refused stating "when we try to go that route nothing ever comes of it. I would just rather go home."   "

## 2020-01-29 ENCOUNTER — PATIENT OUTREACH (OUTPATIENT)
Dept: ADMINISTRATIVE | Facility: OTHER | Age: 35
End: 2020-01-29

## 2020-02-03 ENCOUNTER — OFFICE VISIT (OUTPATIENT)
Dept: GASTROENTEROLOGY | Facility: CLINIC | Age: 35
End: 2020-02-03
Payer: COMMERCIAL

## 2020-02-03 VITALS
DIASTOLIC BLOOD PRESSURE: 89 MMHG | BODY MASS INDEX: 27.13 KG/M2 | HEART RATE: 115 BPM | SYSTOLIC BLOOD PRESSURE: 132 MMHG | WEIGHT: 158.06 LBS

## 2020-02-03 DIAGNOSIS — K59.04 CHRONIC IDIOPATHIC CONSTIPATION: Primary | ICD-10-CM

## 2020-02-03 DIAGNOSIS — K31.84 GASTROPARESIS: ICD-10-CM

## 2020-02-03 PROCEDURE — 3079F PR MOST RECENT DIASTOLIC BLOOD PRESSURE 80-89 MM HG: ICD-10-PCS | Mod: CPTII,S$GLB,, | Performed by: INTERNAL MEDICINE

## 2020-02-03 PROCEDURE — 3008F PR BODY MASS INDEX (BMI) DOCUMENTED: ICD-10-PCS | Mod: CPTII,S$GLB,, | Performed by: INTERNAL MEDICINE

## 2020-02-03 PROCEDURE — 3075F SYST BP GE 130 - 139MM HG: CPT | Mod: CPTII,S$GLB,, | Performed by: INTERNAL MEDICINE

## 2020-02-03 PROCEDURE — 3008F BODY MASS INDEX DOCD: CPT | Mod: CPTII,S$GLB,, | Performed by: INTERNAL MEDICINE

## 2020-02-03 PROCEDURE — 99214 OFFICE O/P EST MOD 30 MIN: CPT | Mod: S$GLB,,, | Performed by: INTERNAL MEDICINE

## 2020-02-03 PROCEDURE — 3075F PR MOST RECENT SYSTOLIC BLOOD PRESS GE 130-139MM HG: ICD-10-PCS | Mod: CPTII,S$GLB,, | Performed by: INTERNAL MEDICINE

## 2020-02-03 PROCEDURE — 3079F DIAST BP 80-89 MM HG: CPT | Mod: CPTII,S$GLB,, | Performed by: INTERNAL MEDICINE

## 2020-02-03 PROCEDURE — 99999 PR PBB SHADOW E&M-EST. PATIENT-LVL III: ICD-10-PCS | Mod: PBBFAC,,, | Performed by: INTERNAL MEDICINE

## 2020-02-03 PROCEDURE — 99214 PR OFFICE/OUTPT VISIT, EST, LEVL IV, 30-39 MIN: ICD-10-PCS | Mod: S$GLB,,, | Performed by: INTERNAL MEDICINE

## 2020-02-03 PROCEDURE — 99999 PR PBB SHADOW E&M-EST. PATIENT-LVL III: CPT | Mod: PBBFAC,,, | Performed by: INTERNAL MEDICINE

## 2020-02-03 NOTE — PROGRESS NOTES
Subjective:       Patient ID: Krystal Wren is a 34 y.o. female.    Chief Complaint: Constipation    This is a 33-year-old female here for a follow-up visit regarding her chronic abdominal symptoms including abdominal distention, early satiety and nausea.  The symptoms are chronic in nature occurring for > 10 years, fluctuating in intensity. Gastric emptying study was consistent with gastroparesis.  PPI therapy and Reglan have not unfortunately relief solved her symptoms.  Persistent nausea, daily and severe without response to ppi, reglan. EGD with botox was done; mild short term improvement.  No headache, changes in vision.  No unintentional weight loss, dark urine, jaundice.  No other exacerbating or relieving factors or other associated symptoms. +constipation    The following portions of the patient's history were reviewed and updated as appropriate: allergies, current medications, past family history, past medical history, past social history, past surgical history and problem list.    (Portions of this note were dictated using voice recognition software and may contain dictation related errors in spelling/grammar/syntax not found on text review)  HPI  Review of Systems   Constitutional: Positive for appetite change and fatigue.   Respiratory: Negative for apnea and chest tightness.    Cardiovascular: Negative for chest pain and leg swelling.   Gastrointestinal: Positive for abdominal distention, abdominal pain, nausea and vomiting.         Objective:      Physical Exam   Constitutional: She is oriented to person, place, and time. She appears well-developed and well-nourished. No distress.   HENT:   Head: Normocephalic and atraumatic.   Eyes: Conjunctivae are normal. No scleral icterus.   Neck: Normal range of motion. Neck supple. No tracheal deviation present. No thyromegaly present.   Pulmonary/Chest: Effort normal. No respiratory distress.   Abdominal: Soft. She exhibits no distension. There is no  tenderness.   Neurological: She is alert and oriented to person, place, and time.   Skin: Skin is warm and dry. No rash noted. She is not diaphoretic. No erythema.   Psychiatric: She has a normal mood and affect. Her behavior is normal.   Nursing note and vitals reviewed.        Labs/imaging; reviewed  Assessment:       1. Chronic idiopathic constipation    2. Gastroparesis        Plan:   1. Trial of motegrity  2. Also discussed domperidone; ekg reviewed

## 2020-02-04 ENCOUNTER — PATIENT MESSAGE (OUTPATIENT)
Dept: FAMILY MEDICINE | Facility: CLINIC | Age: 35
End: 2020-02-04

## 2020-03-20 ENCOUNTER — PATIENT MESSAGE (OUTPATIENT)
Dept: FAMILY MEDICINE | Facility: CLINIC | Age: 35
End: 2020-03-20

## 2020-03-20 DIAGNOSIS — M79.7 FIBROMYALGIA: ICD-10-CM

## 2020-03-20 DIAGNOSIS — M25.50 ARTHRALGIA, UNSPECIFIED JOINT: ICD-10-CM

## 2020-03-21 RX ORDER — TRAMADOL HYDROCHLORIDE 300 MG/1
300 TABLET, EXTENDED RELEASE ORAL DAILY
Qty: 30 TABLET | Refills: 1 | Status: SHIPPED | OUTPATIENT
Start: 2020-03-21 | End: 2020-06-22 | Stop reason: SDUPTHER

## 2020-03-21 RX ORDER — TRAMADOL HYDROCHLORIDE 50 MG/1
TABLET ORAL
Qty: 270 TABLET | Refills: 1 | Status: CANCELLED | OUTPATIENT
Start: 2020-03-21

## 2020-03-27 RX ORDER — CYCLOBENZAPRINE HCL 5 MG
5 TABLET ORAL 3 TIMES DAILY PRN
Qty: 30 TABLET | Refills: 0 | Status: CANCELLED | OUTPATIENT
Start: 2020-03-27

## 2020-03-27 RX ORDER — MODAFINIL 200 MG/1
TABLET ORAL
COMMUNITY
Start: 2020-03-27 | End: 2020-06-22 | Stop reason: SDUPTHER

## 2020-03-27 NOTE — TELEPHONE ENCOUNTER
----- Message from Dana Mckee sent at 3/27/2020  2:20 PM CDT -----  Contact: Patient [phone]  Type: RX Refill Request    Who Called: Patient    Have you contacted your pharmacy: NO    Refill or New Rx: Refill    RX Name and Strength: cyclobenzaprine (FLEXERIL) 5 MG tablet    Is this a 30 day or 90 day RX: 90 day    Preferred Pharmacy with phone number:  .  [id number] [address] [address]  Phone: [phone] Fax: [phone]    Local or Mail Order: Local    Would the patient rather a call back or a response via [facility]? Call back    Best Call Back Number:[phone]

## 2020-03-27 NOTE — TELEPHONE ENCOUNTER
----- Message from Dana Mckee sent at 3/27/2020  2:16 PM CDT -----  Contact: Patient [phone]  Type: RX Refill Request    Who Called: Patient    Have you contacted your pharmacy: No    Refill or New Rx: Refill    RX Name and Strength: traMADoL (ULTRAM-ER) 300 MG Tb24    Is this a 30 day or 90 day RX:90 day    Preferred Pharmacy with phone number:  .    [facility] [phone]    Local or Mail Order: Local    Would the patient rather a call back or a response via [facility]? Call back    Best Call Back Number:[phone]    Additional Information: Patient states that she emailed Dr Singh about the refill a week ago.

## 2020-03-27 NOTE — TELEPHONE ENCOUNTER
Per chart pt has zanaflex filled for a year so should still have this. She should not be on both flexeril and zanaflex   Please have her check with pharmacy

## 2020-03-27 NOTE — TELEPHONE ENCOUNTER
Spoke with patient was told tramadol was approved needs to check with pharmacy , patient verbalized understand . Another refill request  sent to provider

## 2020-03-30 NOTE — TELEPHONE ENCOUNTER
Trying to notify patient as noted below by Lulu , no one answer , left message to check with pharmacy .

## 2020-06-22 DIAGNOSIS — M25.50 ARTHRALGIA, UNSPECIFIED JOINT: Primary | ICD-10-CM

## 2020-06-22 DIAGNOSIS — G47.30 SLEEP APNEA, UNSPECIFIED TYPE: ICD-10-CM

## 2020-06-22 DIAGNOSIS — D89.40 MAST CELL ACTIVATION SYNDROME: ICD-10-CM

## 2020-06-22 NOTE — TELEPHONE ENCOUNTER
Last Office Visit Info:   The patient's last visit with Chirag Singh MD was on 1/10/2020.    The patient's last visit in current department was on 1/10/2020.        Last CBC Results:   Lab Results   Component Value Date    WBC 13.77 (H) 01/23/2020    HGB 14.2 01/23/2020    HCT 43.1 01/23/2020     01/23/2020       Last CMP Results  Lab Results   Component Value Date     01/23/2020    K 3.9 01/23/2020     01/23/2020    CO2 26 01/23/2020    BUN 8 01/23/2020    CREATININE 0.6 01/23/2020    CALCIUM 10.2 01/23/2020    ALBUMIN 4.7 01/23/2020    AST 19 01/23/2020    ALT 18 01/23/2020       Last Lipids  No results found for: CHOL, TRIG, HDL, LDLCALC    Last A1C  Lab Results   Component Value Date    HGBA1C 5.6 06/17/2016       Last TSH  Lab Results   Component Value Date    TSH 5.530 (H) 12/17/2018         Current Med Refills  Medication List with Changes/Refills   Current Medications    ALBUTEROL (PROVENTIL) 2.5 MG /3 ML (0.083 %) NEBULIZER SOLUTION    Take 3 mLs (2.5 mg total) by nebulization every 6 (six) hours as needed for Wheezing. Rescue       Start Date: 6/5/2018  End Date: 6/5/2019    BUTALBITAL-ACETAMINOPHEN-CAFFEINE -40 MG (FIORICET, ESGIC) -40 MG PER TABLET    TK 1 T PO Q 6 HOURS AS NEEDED FOR HEADACHES       Start Date: 1/15/2019 End Date: --    CETIRIZINE (ZYRTEC) 10 MG TABLET    TK 1 T PO QD       Start Date: 12/21/2018End Date: --    CROMOLYN (GASTROCROM) 100 MG/5 ML SOLUTION    Take 200 mg by mouth.       Start Date: 8/10/2018 End Date: --    CYCLOBENZAPRINE (FLEXERIL) 5 MG TABLET    Take 1 tablet (5 mg total) by mouth 3 (three) times daily as needed.       Start Date: 1/7/2020  End Date: --    MODAFINIL (PROVIGIL) 200 MG TAB           Start Date: 3/27/2020 End Date: --    MONTELUKAST (SINGULAIR) 10 MG TABLET    Take 10 mg by mouth once daily.        Start Date: 3/30/2018 End Date: --    RANITIDINE (ZANTAC) 150 MG TABLET    Take 150 mg by mouth.       Start Date: --        End  Date: --    SUCRALFATE (CARAFATE) 100 MG/ML SUSPENSION    Take 10 mLs (1 g total) by mouth 4 (four) times daily.       Start Date: 10/16/2019End Date: --    TIZANIDINE (ZANAFLEX) 4 MG TABLET    Take 1 tablet (4 mg total) by mouth every 6 (six) hours as needed.       Start Date: 6/5/2019  End Date: --    TRAMADOL (ULTRAM) 50 MG TABLET    TAKE 1 TABLET BY MOUTH EVERY 8 HOURS AS NEEDED       Start Date: 1/7/2020  End Date: --    TRAMADOL (ULTRAM-ER) 300 MG TB24    Take 1 tablet (300 mg total) by mouth once daily.       Start Date: 3/21/2020 End Date: --    WALKER MISC    Use daily for safety, rollator walker       Start Date: 4/7/2018  End Date: --    WHEELCHAIR RIVER    Travel wheel chair, 1 hour per day       Start Date: 4/2/2018  End Date: --       Order(s) placed per written order guidelines:     Please advise.

## 2020-06-23 RX ORDER — TRAMADOL HYDROCHLORIDE 300 MG/1
300 TABLET, EXTENDED RELEASE ORAL DAILY
Qty: 30 TABLET | Refills: 1 | Status: SHIPPED | OUTPATIENT
Start: 2020-06-23 | End: 2020-08-21 | Stop reason: SDUPTHER

## 2020-06-23 RX ORDER — MODAFINIL 200 MG/1
TABLET ORAL
Qty: 30 TABLET | Refills: 2 | Status: SHIPPED | OUTPATIENT
Start: 2020-06-23 | End: 2020-08-21 | Stop reason: SDUPTHER

## 2020-07-16 ENCOUNTER — NURSE TRIAGE (OUTPATIENT)
Dept: ADMINISTRATIVE | Facility: CLINIC | Age: 35
End: 2020-07-16

## 2020-07-16 ENCOUNTER — PATIENT MESSAGE (OUTPATIENT)
Dept: FAMILY MEDICINE | Facility: CLINIC | Age: 35
End: 2020-07-16

## 2020-07-16 DIAGNOSIS — M79.10 MYALGIA: ICD-10-CM

## 2020-07-16 DIAGNOSIS — H92.09 OTALGIA, UNSPECIFIED LATERALITY: Primary | ICD-10-CM

## 2020-07-17 NOTE — TELEPHONE ENCOUNTER
Patient states lymph nodes on both sides of her neck are swollen. Spine pain and pain radiates to right ear. Patient is crying in pain. She states if she sits still her pain level is 6/10 but if she moves or breathes it's 10/10. Care advice discussed.  will take her to ED now. Please contact caller directly to discuss any further care advice.    Reason for Disposition   Patient sounds very sick or weak to the triager    Additional Information   Negative: Severe difficulty breathing (e.g., struggling for each breath, speaks in single words)   Negative: Known hernia is main concern (i.e., soft lump or swelling in the groin that goes away when you push on it)   Negative: Swelling of scrotum is main symptom   Negative: Swelling of face is main symptom   Negative: [1] Swollen node is in the neck AND [2] < 1 inch (2.5 cm) in size AND [3] sore throat is main symptom   Negative: [1] Node is in the neck AND [2] causes difficulty breathing   Negative: [1] Lump or swelling in groin AND [2] pulsating (like heartbeat)   Negative: Fever > 103 F (39.4 C)   Negative: [1] Node is in the neck AND [2] can't swallow fluids    Protocols used: LYMPH NODES - [id number]

## 2020-07-24 ENCOUNTER — OFFICE VISIT (OUTPATIENT)
Dept: URGENT CARE | Facility: CLINIC | Age: 35
End: 2020-07-24
Payer: COMMERCIAL

## 2020-07-24 VITALS
WEIGHT: 158 LBS | OXYGEN SATURATION: 95 % | RESPIRATION RATE: 20 BRPM | HEIGHT: 64 IN | DIASTOLIC BLOOD PRESSURE: 78 MMHG | BODY MASS INDEX: 26.98 KG/M2 | TEMPERATURE: 99 F | SYSTOLIC BLOOD PRESSURE: 134 MMHG | HEART RATE: 102 BPM

## 2020-07-24 DIAGNOSIS — H66.91 RIGHT OTITIS MEDIA, UNSPECIFIED OTITIS MEDIA TYPE: Primary | ICD-10-CM

## 2020-07-24 PROCEDURE — 99214 OFFICE O/P EST MOD 30 MIN: CPT | Mod: 25,S$GLB,, | Performed by: FAMILY MEDICINE

## 2020-07-24 PROCEDURE — 96372 THER/PROPH/DIAG INJ SC/IM: CPT | Mod: S$GLB,,, | Performed by: FAMILY MEDICINE

## 2020-07-24 PROCEDURE — 96372 PR INJECTION,THERAP/PROPH/DIAG2ST, IM OR SUBCUT: ICD-10-PCS | Mod: S$GLB,,, | Performed by: FAMILY MEDICINE

## 2020-07-24 PROCEDURE — 99214 PR OFFICE/OUTPT VISIT, EST, LEVL IV, 30-39 MIN: ICD-10-PCS | Mod: 25,S$GLB,, | Performed by: FAMILY MEDICINE

## 2020-07-24 RX ORDER — KETOROLAC TROMETHAMINE 30 MG/ML
30 INJECTION, SOLUTION INTRAMUSCULAR; INTRAVENOUS
Status: COMPLETED | OUTPATIENT
Start: 2020-07-24 | End: 2020-07-24

## 2020-07-24 RX ORDER — AMOXICILLIN AND CLAVULANATE POTASSIUM 250; 62.5 MG/5ML; MG/5ML
500 POWDER, FOR SUSPENSION ORAL 2 TIMES DAILY
Qty: 200 ML | Refills: 0 | Status: SHIPPED | OUTPATIENT
Start: 2020-07-24 | End: 2020-08-03

## 2020-07-24 RX ADMIN — KETOROLAC TROMETHAMINE 30 MG: 30 INJECTION, SOLUTION INTRAMUSCULAR; INTRAVENOUS at 07:07

## 2020-07-24 NOTE — PROGRESS NOTES
"Subjective:       Patient ID: Krystal Wren is a 34 y.o. female.    Vitals:  height is 5' 4" (1.626 m) and weight is 71.7 kg (158 lb). Her temperature is 99 °F (37.2 °C). Her blood pressure is 134/78 and her pulse is 102. Her respiration is 20 and oxygen saturation is 95%.     Chief Complaint: Otalgia    Pt states she has severe ear and neck pain starting 8 days ago. PT also notes swollen lymph nodes with pain alternating on each side. Pt has been taking advil and aleve. PT states she has severe pain swallowing. Pt states she has had this before, but it usually subsides within a few days.    Otalgia   There is pain in the right ear. This is a new problem. The current episode started 1 to 4 weeks ago. The problem occurs constantly. There has been no fever. The pain is at a severity of 10/10. The pain is severe. Associated symptoms include neck pain and a sore throat. Pertinent negatives include no coughing, diarrhea, headaches, rash or vomiting. She has tried acetaminophen for the symptoms. The treatment provided mild relief. Her past medical history is significant for a chronic ear infection.       Constitution: Negative for chills, fatigue and fever.   HENT: Positive for ear pain and sore throat. Negative for congestion.    Neck: Positive for neck pain. Negative for painful lymph nodes.   Cardiovascular: Negative for chest pain and leg swelling.   Eyes: Negative for double vision and blurred vision.   Respiratory: Negative for cough and shortness of breath.    Gastrointestinal: Negative for nausea, vomiting and diarrhea.   Genitourinary: Negative for dysuria, frequency, urgency and history of kidney stones.   Musculoskeletal: Negative for joint pain, joint swelling, muscle cramps and muscle ache.   Skin: Negative for color change, pale, rash and bruising.   Allergic/Immunologic: Negative for seasonal allergies.   Neurological: Negative for dizziness, history of vertigo, light-headedness, passing out and " headaches.   Hematologic/Lymphatic: Negative for swollen lymph nodes.   Psychiatric/Behavioral: Negative for nervous/anxious, sleep disturbance and depression. The patient is not nervous/anxious.        Objective:      Physical Exam   HENT:   Head: Normocephalic and atraumatic.   Ears:   Right Ear: Tympanic membrane is injected, erythematous and retracted.   Left Ear: Tympanic membrane and ear canal normal.   Neck: Normal range of motion.   Cardiovascular: Normal rate, regular rhythm, normal heart sounds and normal pulses.   Pulmonary/Chest: Effort normal and breath sounds normal.   Abdominal: Normal appearance.   Neurological: She is alert.   Nursing note and vitals reviewed.        Assessment:       1. Right otitis media, unspecified otitis media type        Plan:         Right otitis media, unspecified otitis media type  -     ketorolac injection 30 mg  -     amoxicillin-pot clavulanate 250-62.5 mg/5ml (AUGMENTIN) 250-62.5 mg/5 mL suspension; Take 10 mLs (500 mg total) by mouth 2 (two) times daily. for 10 days  Dispense: 200 mL; Refill: 0    OTC tylenol

## 2020-07-25 NOTE — PATIENT INSTRUCTIONS
Middle Ear Infection (Adult)  You have an infection of the middle ear, the space behind the eardrum. This is also called acute otitis media (AOM). Sometimes it is caused by the common cold. This is because congestion can block the internal passage (eustachian tube) that drains fluid from the middle ear. When the middle ear fills with fluid, bacteria can grow there and cause an infection. Oral antibiotics are used to treat this illness, not ear drops. Symptoms usually start to improve within 1 to 2 days of treatment.    Home care  The following are general care guidelines:  · Finish all of the antibiotic medicine given, even though you may feel better after the first few days.  · You may use over-the-counter medicine, such as acetaminophen or ibuprofen, to control pain and fever, unless something else was prescribed. If you have chronic liver or kidney disease or have ever had a stomach ulcer or gastrointestinal bleeding, talk with your healthcare provider before using these medicines. Do not give aspirin to anyone under 18 years of age who has a fever. It may cause severe illness or death.  Follow-up care  Follow up with your healthcare provider, or as advised, in 2 weeks if all symptoms have not gotten better, or if hearing doesn't go back to normal within 1 month.  When to seek medical advice  Call your healthcare provider right away if any of these occur:  · Ear pain gets worse or does not improve after 3 days of treatment  · Unusual drowsiness or confusion  · Neck pain, stiff neck, or headache  · Fluid or blood draining from the ear canal  · Fever of 100.4°F (38°C) or as advised   · Seizure  Date Last Reviewed: 6/1/2016  © 0681-5574 [employer]. [address] [place] [address], [place] [address]. All rights reserved. This information is not intended as a substitute for professional medical care. Always follow your healthcare professional's instructions.

## 2020-07-27 ENCOUNTER — PATIENT MESSAGE (OUTPATIENT)
Dept: GASTROENTEROLOGY | Facility: CLINIC | Age: 35
End: 2020-07-27

## 2020-07-27 NOTE — TELEPHONE ENCOUNTER
Spoke to Patient Advocacy Supervisor and she will file case and also reach out to patient today.     Jackie   Clinic Supervisor

## 2020-07-28 ENCOUNTER — TELEPHONE (OUTPATIENT)
Dept: OTOLARYNGOLOGY | Facility: CLINIC | Age: 35
End: 2020-07-28

## 2020-07-28 ENCOUNTER — PATIENT MESSAGE (OUTPATIENT)
Dept: FAMILY MEDICINE | Facility: CLINIC | Age: 35
End: 2020-07-28

## 2020-07-28 DIAGNOSIS — Z01.812 PRE-PROCEDURE LAB EXAM: ICD-10-CM

## 2020-07-28 RX ORDER — NEOMYCIN SULFATE, POLYMYXIN B SULFATE AND HYDROCORTISONE 10; 3.5; 1 MG/ML; MG/ML; [USP'U]/ML
3 SUSPENSION/ DROPS AURICULAR (OTIC) 3 TIMES DAILY
Qty: 10 ML | Refills: 0 | Status: SHIPPED | OUTPATIENT
Start: 2020-07-28 | End: 2020-08-07

## 2020-07-28 RX ORDER — CYCLOBENZAPRINE HCL 10 MG
10 TABLET ORAL 3 TIMES DAILY PRN
Qty: 45 TABLET | Refills: 0 | Status: SHIPPED | OUTPATIENT
Start: 2020-07-28 | End: 2020-08-21

## 2020-07-28 RX ORDER — TIZANIDINE 4 MG/1
4-12 TABLET ORAL NIGHTLY PRN
Qty: 90 TABLET | Refills: 0 | Status: SHIPPED | OUTPATIENT
Start: 2020-07-28 | End: 2020-08-21 | Stop reason: ALTCHOICE

## 2020-07-29 ENCOUNTER — PATIENT OUTREACH (OUTPATIENT)
Dept: ADMINISTRATIVE | Facility: OTHER | Age: 35
End: 2020-07-29

## 2020-07-29 NOTE — PROGRESS NOTES
Requested updates within Care Everywhere.  Patient's chart was reviewed for overdue [employer] topics.  Immunizations reconciled.    Orders placed:  Tasked appts:  Labs Linked:

## 2020-07-30 DIAGNOSIS — L08.9 SOFT TISSUE INFECTION: Primary | ICD-10-CM

## 2020-07-30 RX ORDER — CEFTRIAXONE 500 MG/1
500 INJECTION, POWDER, FOR SOLUTION INTRAMUSCULAR; INTRAVENOUS
Status: SHIPPED | OUTPATIENT
Start: 2020-07-30 | End: 2020-08-01

## 2020-07-30 RX ORDER — CEFTRIAXONE 1 G/1
1 INJECTION, POWDER, FOR SOLUTION INTRAMUSCULAR; INTRAVENOUS
Status: CANCELLED | OUTPATIENT
Start: 2020-07-30 | End: 2020-07-30

## 2020-07-30 RX ORDER — CEFTRIAXONE 1 G/1
0.5 INJECTION, POWDER, FOR SOLUTION INTRAMUSCULAR; INTRAVENOUS
Status: DISCONTINUED | OUTPATIENT
Start: 2020-07-30 | End: 2020-07-30

## 2020-07-30 NOTE — PROGRESS NOTES
500 mg of ceftriaxone to augment the augmentin for possible soft tissue infection and Otitis media.  Bypass the stomach.  JR

## 2020-07-31 ENCOUNTER — CLINICAL SUPPORT (OUTPATIENT)
Dept: OTOLARYNGOLOGY | Facility: CLINIC | Age: 35
End: 2020-07-31
Payer: COMMERCIAL

## 2020-07-31 ENCOUNTER — OFFICE VISIT (OUTPATIENT)
Dept: OTOLARYNGOLOGY | Facility: CLINIC | Age: 35
End: 2020-07-31
Payer: COMMERCIAL

## 2020-07-31 VITALS
HEART RATE: 92 BPM | WEIGHT: 138.75 LBS | SYSTOLIC BLOOD PRESSURE: 129 MMHG | BODY MASS INDEX: 23.69 KG/M2 | DIASTOLIC BLOOD PRESSURE: 94 MMHG | HEIGHT: 64 IN

## 2020-07-31 DIAGNOSIS — H90.11 CONDUCTIVE HEARING LOSS OF RIGHT EAR WITH UNRESTRICTED HEARING OF LEFT EAR: Primary | ICD-10-CM

## 2020-07-31 DIAGNOSIS — J31.0 CHRONIC RHINITIS: ICD-10-CM

## 2020-07-31 DIAGNOSIS — M26.621 ARTHRALGIA OF RIGHT TEMPOROMANDIBULAR JOINT: ICD-10-CM

## 2020-07-31 DIAGNOSIS — H92.01 RIGHT EAR PAIN: ICD-10-CM

## 2020-07-31 DIAGNOSIS — H69.91 DYSFUNCTION OF RIGHT EUSTACHIAN TUBE: ICD-10-CM

## 2020-07-31 PROCEDURE — 99203 OFFICE O/P NEW LOW 30 MIN: CPT | Mod: S$GLB,,, | Performed by: OTOLARYNGOLOGY

## 2020-07-31 PROCEDURE — 99999 PR PBB SHADOW E&M-EST. PATIENT-LVL IV: ICD-10-PCS | Mod: PBBFAC,,, | Performed by: OTOLARYNGOLOGY

## 2020-07-31 PROCEDURE — 3080F PR MOST RECENT DIASTOLIC BLOOD PRESSURE >= 90 MM HG: ICD-10-PCS | Mod: CPTII,S$GLB,, | Performed by: OTOLARYNGOLOGY

## 2020-07-31 PROCEDURE — 92567 PR TYMPA2METRY: ICD-10-PCS | Mod: S$GLB,,, | Performed by: AUDIOLOGIST-HEARING AID FITTER

## 2020-07-31 PROCEDURE — 99999 PR PBB SHADOW E&M-EST. PATIENT-LVL IV: CPT | Mod: PBBFAC,,, | Performed by: OTOLARYNGOLOGY

## 2020-07-31 PROCEDURE — 3080F DIAST BP >= 90 MM HG: CPT | Mod: CPTII,S$GLB,, | Performed by: OTOLARYNGOLOGY

## 2020-07-31 PROCEDURE — 3074F SYST BP LT 130 MM HG: CPT | Mod: CPTII,S$GLB,, | Performed by: OTOLARYNGOLOGY

## 2020-07-31 PROCEDURE — 92567 TYMPANOMETRY: CPT | Mod: S$GLB,,, | Performed by: AUDIOLOGIST-HEARING AID FITTER

## 2020-07-31 PROCEDURE — 92557 COMPREHENSIVE HEARING TEST: CPT | Mod: S$GLB,,, | Performed by: AUDIOLOGIST-HEARING AID FITTER

## 2020-07-31 PROCEDURE — 92557 PR COMPREHENSIVE HEARING TEST: ICD-10-PCS | Mod: S$GLB,,, | Performed by: AUDIOLOGIST-HEARING AID FITTER

## 2020-07-31 PROCEDURE — 99999 PR PBB SHADOW E&M-EST. PATIENT-LVL I: ICD-10-PCS | Mod: PBBFAC,,, | Performed by: AUDIOLOGIST-HEARING AID FITTER

## 2020-07-31 PROCEDURE — 3008F BODY MASS INDEX DOCD: CPT | Mod: CPTII,S$GLB,, | Performed by: OTOLARYNGOLOGY

## 2020-07-31 PROCEDURE — 99999 PR PBB SHADOW E&M-EST. PATIENT-LVL I: CPT | Mod: PBBFAC,,, | Performed by: AUDIOLOGIST-HEARING AID FITTER

## 2020-07-31 PROCEDURE — 3008F PR BODY MASS INDEX (BMI) DOCUMENTED: ICD-10-PCS | Mod: CPTII,S$GLB,, | Performed by: OTOLARYNGOLOGY

## 2020-07-31 PROCEDURE — 3074F PR MOST RECENT SYSTOLIC BLOOD PRESSURE < 130 MM HG: ICD-10-PCS | Mod: CPTII,S$GLB,, | Performed by: OTOLARYNGOLOGY

## 2020-07-31 PROCEDURE — 99203 PR OFFICE/OUTPT VISIT, NEW, LEVL III, 30-44 MIN: ICD-10-PCS | Mod: S$GLB,,, | Performed by: OTOLARYNGOLOGY

## 2020-07-31 RX ORDER — CETIRIZINE HYDROCHLORIDE 10 MG/1
10 TABLET ORAL NIGHTLY
Qty: 30 TABLET | Refills: 2 | Status: SHIPPED | OUTPATIENT
Start: 2020-07-31

## 2020-07-31 RX ORDER — FLUTICASONE PROPIONATE 50 MCG
1 SPRAY, SUSPENSION (ML) NASAL 2 TIMES DAILY
Qty: 16 G | Refills: 11 | Status: SHIPPED | OUTPATIENT
Start: 2020-07-31

## 2020-07-31 NOTE — PROGRESS NOTES
Otolaryngology Clinic Note    Krystal Wren  Encounter Date: 7/31/2020   YOB: 1985  Referring Physician: Aaareferral Self  No address on file   PCP: Chirag Singh MD    Chief Complaint:   Chief Complaint   Patient presents with    Otalgia     right ear. started 12 days ago.     Sore Throat     pain has started to subside       HPI: Krystal Wren is a 34 y.o. female here for ear pain. Pain started almost two weeks ago. Went to Urgent care on 7/24. Was given oral augmentin. Took four doses but reports she cannot tolerate. Also reports sore throat. Pain has improved. Also using some cortisporin drops. Does report ear pain is much better.  Lymph node/neck much less swollen.     Intermittent ringing in the ears. Reports history of chronic ear infections as a child. Hearing on right seems a little muffled but denies significant change in hearing.    No fevers, cough. Has had some nasal congestion but improving.    Noise exposure: lots of concerts when she was younger  Ear protection: no  Prior ear surgeries: yes - multiple sets of tubes as a child  Prior trauma: no  History of ear infections: yes  Family history of hearing loss: unknown - adopted  Hearing aids: not applicable      Review of Systems   Constitutional: Negative for chills, fever and weight loss.   HENT: Positive for congestion, ear pain, hearing loss (muffled), sore throat and tinnitus. Negative for sinus pain.    Eyes: Negative for blurred vision and double vision.   Respiratory: Negative for cough and shortness of breath.    Cardiovascular: Negative for chest pain and palpitations.   Gastrointestinal: Negative for nausea and vomiting.   Musculoskeletal: Negative for joint pain and neck pain.   Skin: Negative for rash.   Neurological: Negative for dizziness, focal weakness and headaches.   Psychiatric/Behavioral: Negative for depression. The patient is not nervous/anxious.         Review of patient's allergies indicates:    Allergen Reactions    Ceclor [cefaclor] Hives    Cymbalta [duloxetine]      suicidal    Elavil [amitriptyline]      fatigue    Lyrica [pregabalin]      edema    Neurontin [gabapentin]      Confusion, brain fog       Past Medical History:   Diagnosis Date    Allergy     Anxiety     Asthma     Depression     Fibromyalgia     Fibromyalgia 2000    Gastroparesis     Hyperlipidemia     Hypertension     Irritable bowel syndrome     Migraine     Recurrent upper respiratory infection (URI)     Suicidal behavior     Urticaria        Past Surgical History:   Procedure Laterality Date     SECTION      COLONOSCOPY      ESOPHAGOGASTRODUODENOSCOPY      ESOPHAGOGASTRODUODENOSCOPY N/A 2019    Procedure: ESOPHAGOGASTRODUODENOSCOPY (EGD);  Surgeon: David Marin MD;  Location: [facility];  Service: Endoscopy;  Laterality: N/A;  with botox    HYSTERECTOMY      NOSE SURGERY      SINUS SURGERY         Social History     Socioeconomic History    Marital status:      Spouse name: Elian    Number of children: 1    Years of education: Not on file    Highest education level: Not on file   Occupational History    Occupation: [employer]   Social Needs    Financial resource strain: Somewhat hard    Food insecurity     Worry: Sometimes true     Inability: Sometimes true    Transportation needs     Medical: Yes     Non-medical: Yes   Tobacco Use    Smoking status: Current Every Day Smoker     Packs/day: 1.00    Smokeless tobacco: Never Used    Tobacco comment:    Substance and Sexual Activity    Alcohol use: Yes     Frequency: Never     Drinks per session: Patient refused     Binge frequency: Patient refused     Comment: socially     Drug use: No    Sexual activity: Yes     Partners: Male     Birth control/protection: OCP   Lifestyle    Physical activity     Days per week: Not on file     Minutes per session: Not on file    Stress: Rather much   Relationships    Social  connections     Talks on phone: More than three times a week     Gets together: Patient refused     Attends Church service: Not on file     Active member of club or organization: Yes     Attends meetings of clubs or organizations: More than 4 times per year     Relationship status:    Other Topics Concern    Not on file   Social History Narrative    Pt: AMPARO    : Elian - works [employer]    One 9 year old daughter - IOL at 39wks -> [employer]TP -> C/S       Family History   Adopted: Yes   Problem Relation Age of Onset    Hypertension Brother     Asthma Brother     Allergies Brother     Diabetes Sister     Hypertension Sister     Allergies Sister     Psoriasis Daughter     Psoriasis Son     Allergies Son     Lupus Sister         type unknown    Allergies Sister     Rheum arthritis Sister         takes Humira    Breast cancer Neg Hx     Colon cancer Neg Hx     Ovarian cancer Neg Hx     Inflammatory bowel disease Neg Hx     Thyroid disease Neg Hx        Outpatient Encounter Medications as of 7/31/2020   Medication Sig Dispense Refill    butalbital-acetaminophen-caffeine -40 mg (FIORICET, ESGIC) -40 mg per tablet TK 1 T PO Q 6 HOURS AS NEEDED FOR HEADACHES 45 tablet 1    cyclobenzaprine (FLEXERIL) 10 MG tablet Take 1 tablet (10 mg total) by mouth 3 (three) times daily as needed. 45 tablet 0    modafiniL (PROVIGIL) 200 MG Tab TAKE 1 TABLET (200MG) BY MOUTH EVERY DAY 30 tablet 2    neomycin-polymyxin-hydrocortisone (CORTISPORIN) 3.5-10,000-1 mg/mL-unit/mL-% otic suspension Place 3 drops into both ears 3 (three) times daily. for 10 days 10 mL 0    ranitidine (ZANTAC) 150 MG tablet Take 150 mg by mouth.      tiZANidine (ZANAFLEX) 4 MG tablet Take 1-3 tablets (4-12 mg total) by mouth nightly as needed (muscle spasm). 90 tablet 0    traMADol (ULTRAM) 50 mg tablet TAKE 1 TABLET BY MOUTH EVERY 8 HOURS AS NEEDED 270 tablet 1    traMADoL (ULTRAM-ER) 300 MG Tb24  Take 1 tablet (300 mg total) by mouth once daily. 30 tablet 1    walker Misc Use daily for safety, rollator walker 1 each 0    wheelchair [employer] wheel chair, 1 hour per day 1 each 0    albuterol (PROVENTIL) 2.5 mg /3 mL (0.083 %) nebulizer solution Take 3 mLs (2.5 mg total) by nebulization every 6 (six) hours as needed for Wheezing. Rescue 1 Box 0    amoxicillin-pot clavulanate 250-62.5 mg/5ml (AUGMENTIN) 250-62.5 mg/5 mL suspension Take 10 mLs (500 mg total) by mouth 2 (two) times daily. for 10 days (Patient not taking: Reported on 7/31/2020) 200 mL 0    cetirizine (ZYRTEC) 10 MG tablet Take 1 tablet (10 mg total) by mouth every evening. 30 tablet 2    cromolyn (GASTROCROM) 100 mg/5 mL solution Take 200 mg by mouth.      fluticasone propionate (FLONASE) 50 mcg/actuation nasal spray 1 spray (50 mcg total) by Each Nostril route 2 (two) times daily. 16 g 11    montelukast (SINGULAIR) 10 mg tablet Take 10 mg by mouth once daily.   11    [DISCONTINUED] cetirizine (ZYRTEC) 10 MG tablet TK 1 T PO QD  1     Facility-Administered Encounter Medications as of 7/31/2020   Medication Dose Route Frequency Provider Last Rate Last Dose    cefTRIAXone injection 500 mg  500 mg Intramuscular Q24H Chirag Singh MD           Physical Exam:    Vitals:    07/31/20 1115   BP: (!) 129/94   Pulse: 92       Constitutional  General Appearance: well nourished, well-developed, alert, oriented, in no acute distress  Communication: ability, understanding, voice quality normal  Head and Face  Inspection: normocephalic, atraumatic, no scars, lesions or masses    Palpation: no stepoffs, sinus tenderness or masses  Parotid glands: no masses, stones, swelling or tenderness  Eyes  Ocular Motility / Alignment: normal alignment, motility, no proptosis, enophthalmus or nystagmus  Conjunctiva: not injected  Eyelids: no entropion or ectropion, no edema  Ears  Hearing: speech reception thresholds grossly normal  External Ears: no auricle  lesions, non-tender, mobile to palpation  Tender over TMJ on right, audible jaw popping  Otoscopy with microscope:  Right Ear: no tympanic membrane lesions or perforations, evidence of drops in canal and on drum, may have small amount of fluid in middle ear but no purulence, no bulging, no erythema, normal EAC  Left Ear: no tympanic membrane lesions, perforations, or effusion, normal EAC  Nose  External Nose: no lesions, tenderness, trauma or deformity  Intranasal Exam: no edema, erythema, discharge, mass or obstruction  Oral Cavity / Oropharynx  Lips: upper and lower lips pink and moist  Teeth: dentition good  Gingiva: healthy  Oral Mucosa: moist, no mucosal lesions  Floor of Mouth: normal, no lesions  Tongue: moist, normal mobility, no lesions  Palate: soft and hard palates without lesions or ulcers  Oropharynx: tonsils and walls without erythema, exudate, lesions, fullness or obstruction  Neck  Inspection and Palpation: no erythema, induration, emphysema, tenderness or masses  Larynx and Trachea: normal position and mobility   Thyroid: no tenderness, enlargement or nodules  Submandibular Glands: no masses or tenderness  Lymphatic:  Anterior, Posterior, Submandibular, Submental, Supraclavicular: no lymphadenopathy present  Chest / Respiratory  Chest: no stridor or retractions, normal effort and expansion  Cardiovascular:  Pulses: 2+ radial pulses, regular rhythm and rate  Auscultation: deferred  Neurological  Cranial Nerves: grossly intact  General: no focal deficits  Psychiatric  Orientation: oriented to time, place and person  Mood and Affect: no depression, anxiety or agitation  Extremities  Inspection: moves all extremities well    Procedures:  No notes on file    Pertinent Data:  ? LABS:   ? AUDIO:    ? PATH:  ? CULTURE:    I personally reviewed the following pertinent data at today's visit: Audiogram. Interpretation by me - normal type A tymp on the left, right with -125 indicative of ETD, mild CHL low  frequencies      Imaging:   ? XRAY:  ? Ultrasound:  ? CT Scan:  ? MRI Scan:  ? PET/CT Scan:    I personally reviewed the following images:    Miscellaneous:     [place] Sleepiness Scale-     Reflux Symptom Index (RSI) -       Summary of Outside Records:       Assessment and Plan:  Krystal Wren is a 34 y.o. female with     Conductive hearing loss of right ear with unrestricted hearing of left ear    Right ear pain    Dysfunction of right eustachian tube    Chronic rhinitis  -     fluticasone propionate (FLONASE) 50 mcg/actuation nasal spray; 1 spray (50 mcg total) by Each Nostril route 2 (two) times daily.  Dispense: 16 g; Refill: 11  -     cetirizine (ZYRTEC) 10 MG tablet; Take 1 tablet (10 mg total) by mouth every evening.  Dispense: 30 tablet; Refill: 2    Arthralgia of right temporomandibular joint       Had a very long discussion with patient. Suspect her symptoms likely multifactorial - TMJ arthralgia, ETD. Acute infection seems to have cleared but right ear still with some negative pressure, slight CHL and may have partial effusion. No evidence of otitis externa so no need to use drops. We discussed the etiology of the ear pain and the anatomy of the TMJ. Recommend TMJ precautions with soft diet, no gum chewing, muscle relaxer (already on), follow up with dentist and warm compresses. Discussed anatomy and function of eustachian tube. Reviewed audiogram and tymps. Counseled on gentle auto-insufflation, use of nasal steroid spray and oral antihistamine. RTC if symptoms worsen otherwise plan on follow up in 3 months with repeat tymps and audio if normal.            SARA Camejo MD  [facility]

## 2020-07-31 NOTE — PATIENT INSTRUCTIONS
TMJ Patient Information        Eustachian Tube Dysfunction    What is Eustachian tube dysfunction (ETD)?   ETD occurs when there is a dysfunction of the Eustachian tube. This condition prevents the release of pressure and fluid from the middle space (the space behind the tympanic membrane or ear drum). This can occur when there is increased swelling or fluid/mucus buildup at the opening of the Eustachian tube in the back of the nose. It can occur during common colds, upper respiratory tract infections, allergies, impeded nasal airflow or chronic drainage of mucus on the tube opening.    What is the Eustachian tube?  The Eustachian Tube (ET) is a small passage way that connects the middle ear to the nasopharynx. The tube is usually closed and opens when a person eats, swallows, yawns or attempts to pop his or her ears. The tube function to equalize the pressure between the ear and the surrounding environment. Therefore it helps prevent pressure and fluid from building up inside the ears.        What are the symptoms of ETD?   Common symptoms include:  Ear pressure  Hearing loss - ear feels plugged  Popping sensation in the ears  Ringing in ears, tinnitus  Ear pain  Dizziness on occasion    How is ETD diagnosed?   ETD can be diagnosed through a thorough head and neck examination. The physician will look in the ears to see the eardrum and into the nasal cavity. On many occasion, a good history can diagnose the condition as well. Another option is a hearing test (audiogram and tympanogram) to confirm the presence of negative pressure and/or fluid in the middle ear space.    How is it treated?   There are several treatment options for ETD ranging from simple to more invasive. In most cases, once the underlying cause of the ETD (cold, allergies, or flu) resolves, ETD symptoms will also improve.    Treatments include:   The patient can autoinsufflate - to do this, the patient pinches his or her nostrils and then  exhales with a close mouth. Gentle blowing during this maneuver will also force air into the Eustachian tube and thus pop it open   Oral decongestants, topical decongestant or oral steroids may be prescribed to decrease the swelling at the Eustachian tube.   Topical nasal steroids (fluticasone, plus many others) and oral antihistamines (loratadine, plus many others) may be used to treat allergies.   Ear tubes (small tubes that are place into the tympanic membrane) directly equalize the pressure and drain the fluid behind the ear drum. This procedure may be performed in the office for adult patients.

## 2020-08-21 ENCOUNTER — OFFICE VISIT (OUTPATIENT)
Dept: FAMILY MEDICINE | Facility: CLINIC | Age: 35
End: 2020-08-21
Payer: COMMERCIAL

## 2020-08-21 VITALS
HEART RATE: 106 BPM | RESPIRATION RATE: 16 BRPM | SYSTOLIC BLOOD PRESSURE: 120 MMHG | HEIGHT: 64 IN | OXYGEN SATURATION: 97 % | WEIGHT: 147.94 LBS | TEMPERATURE: 98 F | BODY MASS INDEX: 25.25 KG/M2 | DIASTOLIC BLOOD PRESSURE: 86 MMHG

## 2020-08-21 DIAGNOSIS — G47.30 SLEEP APNEA, UNSPECIFIED TYPE: ICD-10-CM

## 2020-08-21 DIAGNOSIS — M25.50 ARTHRALGIA, UNSPECIFIED JOINT: ICD-10-CM

## 2020-08-21 DIAGNOSIS — M79.10 MYALGIA: ICD-10-CM

## 2020-08-21 DIAGNOSIS — K31.84 GASTROPARESIS: Primary | ICD-10-CM

## 2020-08-21 DIAGNOSIS — D89.40 MAST CELL ACTIVATION SYNDROME: ICD-10-CM

## 2020-08-21 DIAGNOSIS — M79.7 FIBROMYALGIA: ICD-10-CM

## 2020-08-21 PROCEDURE — 3074F SYST BP LT 130 MM HG: CPT | Mod: CPTII,S$GLB,, | Performed by: FAMILY MEDICINE

## 2020-08-21 PROCEDURE — 3008F BODY MASS INDEX DOCD: CPT | Mod: CPTII,S$GLB,, | Performed by: FAMILY MEDICINE

## 2020-08-21 PROCEDURE — 3008F PR BODY MASS INDEX (BMI) DOCUMENTED: ICD-10-PCS | Mod: CPTII,S$GLB,, | Performed by: FAMILY MEDICINE

## 2020-08-21 PROCEDURE — 99999 PR PBB SHADOW E&M-EST. PATIENT-LVL III: CPT | Mod: PBBFAC,,, | Performed by: FAMILY MEDICINE

## 2020-08-21 PROCEDURE — 99215 PR OFFICE/OUTPT VISIT, EST, LEVL V, 40-54 MIN: ICD-10-PCS | Mod: S$GLB,,, | Performed by: FAMILY MEDICINE

## 2020-08-21 PROCEDURE — 99999 PR PBB SHADOW E&M-EST. PATIENT-LVL III: ICD-10-PCS | Mod: PBBFAC,,, | Performed by: FAMILY MEDICINE

## 2020-08-21 PROCEDURE — 3079F DIAST BP 80-89 MM HG: CPT | Mod: CPTII,S$GLB,, | Performed by: FAMILY MEDICINE

## 2020-08-21 PROCEDURE — 3074F PR MOST RECENT SYSTOLIC BLOOD PRESSURE < 130 MM HG: ICD-10-PCS | Mod: CPTII,S$GLB,, | Performed by: FAMILY MEDICINE

## 2020-08-21 PROCEDURE — 99215 OFFICE O/P EST HI 40 MIN: CPT | Mod: S$GLB,,, | Performed by: FAMILY MEDICINE

## 2020-08-21 PROCEDURE — 3079F PR MOST RECENT DIASTOLIC BLOOD PRESSURE 80-89 MM HG: ICD-10-PCS | Mod: CPTII,S$GLB,, | Performed by: FAMILY MEDICINE

## 2020-08-21 RX ORDER — BUTALBITAL, ACETAMINOPHEN AND CAFFEINE 50; 325; 40 MG/1; MG/1; MG/1
TABLET ORAL
Qty: 45 TABLET | Refills: 1 | Status: SHIPPED | OUTPATIENT
Start: 2020-08-21

## 2020-08-21 RX ORDER — MODAFINIL 200 MG/1
TABLET ORAL
Qty: 30 TABLET | Refills: 5 | Status: SHIPPED | OUTPATIENT
Start: 2020-08-21

## 2020-08-21 RX ORDER — CYCLOBENZAPRINE HCL 10 MG
TABLET ORAL
Qty: 180 TABLET | Refills: 3 | Status: SHIPPED | OUTPATIENT
Start: 2020-08-21

## 2020-08-21 RX ORDER — TRAMADOL HYDROCHLORIDE 50 MG/1
TABLET ORAL
Qty: 270 TABLET | Refills: 1 | Status: SHIPPED | OUTPATIENT
Start: 2020-08-21

## 2020-08-21 RX ORDER — PANTOPRAZOLE SODIUM 40 MG/1
40 TABLET, DELAYED RELEASE ORAL DAILY
Qty: 30 TABLET | Refills: 11 | Status: SHIPPED | OUTPATIENT
Start: 2020-08-21 | End: 2021-08-21

## 2020-08-21 RX ORDER — TRAMADOL HYDROCHLORIDE 300 MG/1
300 TABLET, EXTENDED RELEASE ORAL DAILY
Qty: 30 TABLET | Refills: 5 | Status: SHIPPED | OUTPATIENT
Start: 2020-08-21

## 2020-08-21 RX ORDER — TIZANIDINE 4 MG/1
4-12 TABLET ORAL NIGHTLY PRN
Qty: 270 TABLET | Refills: 1 | Status: SHIPPED | OUTPATIENT
Start: 2020-08-21

## 2020-08-21 NOTE — PROGRESS NOTES
HISTORY OF PRESENT ILLNESS:  Krystal Wren is a 34 y.o. female who presents to the clinic today for Medication Refill  .     Review her chronic conditions.  Stable on medications and compliant  Her ear and sinus infection is improved currently.  Still doing well.  Has not been able ot the the flexeril for the day and the zanaflex for at night which has led to issues with her sleep and more pain.  She is trying to manage everything but it is difficult at times.  Affects every facet of her life.          PAST MEDICAL HISTORY:  Past Medical History:   Diagnosis Date    Allergy     Anxiety     Asthma     Depression     Fibromyalgia     Fibromyalgia     Gastroparesis     Hyperlipidemia     Hypertension     Irritable bowel syndrome     Migraine     Recurrent upper respiratory infection (URI)     Suicidal behavior     Urticaria        PAST SURGICAL HISTORY:  Past Surgical History:   Procedure Laterality Date     SECTION      COLONOSCOPY      ESOPHAGOGASTRODUODENOSCOPY      ESOPHAGOGASTRODUODENOSCOPY N/A 2019    Procedure: ESOPHAGOGASTRODUODENOSCOPY (EGD);  Surgeon: David Marin MD;  Location: [facility];  Service: Endoscopy;  Laterality: N/A;  with botox    HYSTERECTOMY      NOSE SURGERY      SINUS SURGERY         SOCIAL HISTORY:  Social History     Socioeconomic History    Marital status:      Spouse name: Elian    Number of children: 1    Years of education: Not on file    Highest education level: Not on file   Occupational History    Occupation: [employer]   Social Needs    Financial resource strain: Somewhat hard    Food insecurity     Worry: Sometimes true     Inability: Sometimes true    Transportation needs     Medical: Yes     Non-medical: Yes   Tobacco Use    Smoking status: Current Every Day Smoker     Packs/day: 1.00    Smokeless tobacco: Never Used    Tobacco comment:    Substance and Sexual Activity    Alcohol use: Yes     Frequency:  Never     Drinks per session: Patient refused     Binge frequency: Patient refused     Comment: socially     Drug use: No    Sexual activity: Yes     Partners: Male     Birth control/protection: OCP   Lifestyle    Physical activity     Days per week: Not on file     Minutes per session: Not on file    Stress: Rather much   Relationships    Social connections     Talks on phone: More than three times a week     Gets together: Patient refused     Attends Zoroastrian service: Not on file     Active member of club or organization: Yes     Attends meetings of clubs or organizations: More than 4 times per year     Relationship status:    Other Topics Concern    Not on file   Social History Narrative    Pt: AMPARO    : Elian - works [employer]    One 9 year old daughter - IOL at 39wks -> FTP -> C/S       FAMILY HISTORY:  Family History   Adopted: Yes   Problem Relation Age of Onset    Hypertension Brother     Asthma Brother     Allergies Brother     Diabetes Sister     Hypertension Sister     Allergies Sister     Psoriasis Daughter     Psoriasis Son     Allergies Son     Lupus Sister         type unknown    Allergies Sister     Rheum arthritis Sister         takes Humira    Breast cancer Neg Hx     Colon cancer Neg Hx     Ovarian cancer Neg Hx     Inflammatory bowel disease Neg Hx     Thyroid disease Neg Hx        ALLERGIES AND MEDICATIONS: updated and reviewed.  Review of patient's allergies indicates:   Allergen Reactions    Ceclor [cefaclor] Hives    Cymbalta [duloxetine]      suicidal    Elavil [amitriptyline]      fatigue    Lyrica [pregabalin]      edema    Neurontin [gabapentin]      Confusion, brain fog     Medication List with Changes/Refills   New Medications    PANTOPRAZOLE (PROTONIX) 40 MG TABLET    Take 1 tablet (40 mg total) by mouth once daily.   Current Medications    ALBUTEROL (PROVENTIL) 2.5 MG /3 ML (0.083 %) NEBULIZER SOLUTION    Take 3 mLs  (2.5 mg total) by nebulization every 6 (six) hours as needed for Wheezing. Rescue    CETIRIZINE (ZYRTEC) 10 MG TABLET    Take 1 tablet (10 mg total) by mouth every evening.    FLUTICASONE PROPIONATE (FLONASE) 50 MCG/ACTUATION NASAL SPRAY    1 spray (50 mcg total) by Each Nostril route 2 (two) times daily.   Changed and/or Refilled Medications    Modified Medication Previous Medication    BUTALBITAL-ACETAMINOPHEN-CAFFEINE -40 MG (FIORICET, ESGIC) -40 MG PER TABLET butalbital-acetaminophen-caffeine -40 mg (FIORICET, ESGIC) -40 mg per tablet       TK 1 T PO Q 6 HOURS AS NEEDED FOR HEADACHES    TK 1 T PO Q 6 HOURS AS NEEDED FOR HEADACHES    CYCLOBENZAPRINE (FLEXERIL) 10 MG TABLET cyclobenzaprine (FLEXERIL) 10 MG tablet       Take 1 PO in AM and 1 PO at noon PRN myalgia    Take 1 tablet (10 mg total) by mouth 3 (three) times daily as needed.    MODAFINIL (PROVIGIL) 200 MG TAB modafiniL (PROVIGIL) 200 MG Tab       TAKE 1 TABLET (200MG) BY MOUTH EVERY DAY    TAKE 1 TABLET (200MG) BY MOUTH EVERY DAY    TIZANIDINE (ZANAFLEX) 4 MG TABLET tiZANidine (ZANAFLEX) 4 MG tablet       Take 1-3 tablets (4-12 mg total) by mouth nightly as needed (muscle spasm).    Take 1-3 tablets (4-12 mg total) by mouth nightly as needed (muscle spasm).    TRAMADOL (ULTRAM) 50 MG TABLET traMADol (ULTRAM) 50 mg tablet       TAKE 1 TABLET BY MOUTH EVERY 8 HOURS AS NEEDED    TAKE 1 TABLET BY MOUTH EVERY 8 HOURS AS NEEDED    TRAMADOL (ULTRAM-ER) 300 MG TB24 traMADoL (ULTRAM-ER) 300 MG Tb24       Take 1 tablet (300 mg total) by mouth once daily.    Take 1 tablet (300 mg total) by mouth once daily.   Discontinued Medications    CROMOLYN (GASTROCROM) 100 MG/5 ML SOLUTION    Take 200 mg by mouth.    MONTELUKAST (SINGULAIR) 10 MG TABLET    Take 10 mg by mouth once daily.     RANITIDINE (ZANTAC) 150 MG TABLET    Take 150 mg by mouth.    WALKER MISC    Use daily for safety, rollator walker    WHEELCHAIR RIVER    Travel wheel chair, 1 hour  "per day          CARE TEAM:  Patient Care Team:  Chirag Singh MD as PCP - General (Family Medicine)  Chirag Singh MD as PCP - [facility] Attributed  Earl Min MD as Consulting Physician (Internal Medicine)  VILMA Flores MD as Obstetrician (Obstetrics)  Annemarie Feldman MA as Care Coordinator         REVIEW OF SYSTEMS:  Review of Systems   Constitutional: Positive for activity change, appetite change, fatigue and unexpected weight change. Negative for chills, diaphoresis and fever.   HENT: Negative.  Negative for congestion.         TMJ   Eyes: Negative.    Respiratory: Positive for shortness of breath. Negative for choking, chest tightness and stridor.    Cardiovascular: Negative.    Gastrointestinal: Positive for abdominal distention and abdominal pain. Negative for anal bleeding.   Genitourinary: Negative.    Musculoskeletal: Positive for arthralgias, back pain, gait problem, joint swelling, myalgias and neck pain. Negative for neck stiffness.   Skin: Negative.    Neurological: Negative for headaches.   Psychiatric/Behavioral: Positive for agitation and decreased concentration.         PHYSICAL EXAM:   Vitals:    08/21/20 1439   BP: 120/86   Pulse: 106   Resp: 16   Temp: 98.4 °F (36.9 °C)     Weight: 67.1 kg (147 lb 14.9 oz)   Height: 5' 4" (162.6 cm)   Body mass index is 25.39 kg/m².     General appearance - chronically ill appearing, but her normal self  Mental status - alert, oriented to person, place, and time  Eyes - pupils equal and reactive, extraocular eye movements intact  Ears - improved, good hearing  Nose -   Mouth -   Neck - supple, no significant adenopathy  Lymphatics - no palpable lymphadenopathy, no hepatosplenomegaly  Chest - clear to auscultation, no wheezes, rales or rhonchi, symmetric air entry  Heart - normal rate, regular rhythm, normal S1, S2, no murmurs, rubs, clicks or gallops  Abdomen - mild tachycardia  Back exam -   Neurological - Musculoskeletal -   Skin " -        Medication List with Changes/Refills   New Medications    PANTOPRAZOLE (PROTONIX) 40 MG TABLET    Take 1 tablet (40 mg total) by mouth once daily.   Current Medications    ALBUTEROL (PROVENTIL) 2.5 MG /3 ML (0.083 %) NEBULIZER SOLUTION    Take 3 mLs (2.5 mg total) by nebulization every 6 (six) hours as needed for Wheezing. Rescue    CETIRIZINE (ZYRTEC) 10 MG TABLET    Take 1 tablet (10 mg total) by mouth every evening.    FLUTICASONE PROPIONATE (FLONASE) 50 MCG/ACTUATION NASAL SPRAY    1 spray (50 mcg total) by Each Nostril route 2 (two) times daily.   Changed and/or Refilled Medications    Modified Medication Previous Medication    BUTALBITAL-ACETAMINOPHEN-CAFFEINE -40 MG (FIORICET, ESGIC) -40 MG PER TABLET butalbital-acetaminophen-caffeine -40 mg (FIORICET, ESGIC) -40 mg per tablet       TK 1 T PO Q 6 HOURS AS NEEDED FOR HEADACHES    TK 1 T PO Q 6 HOURS AS NEEDED FOR HEADACHES    CYCLOBENZAPRINE (FLEXERIL) 10 MG TABLET cyclobenzaprine (FLEXERIL) 10 MG tablet       Take 1 PO in AM and 1 PO at noon PRN myalgia    Take 1 tablet (10 mg total) by mouth 3 (three) times daily as needed.    MODAFINIL (PROVIGIL) 200 MG TAB modafiniL (PROVIGIL) 200 MG Tab       TAKE 1 TABLET (200MG) BY MOUTH EVERY DAY    TAKE 1 TABLET (200MG) BY MOUTH EVERY DAY    TIZANIDINE (ZANAFLEX) 4 MG TABLET tiZANidine (ZANAFLEX) 4 MG tablet       Take 1-3 tablets (4-12 mg total) by mouth nightly as needed (muscle spasm).    Take 1-3 tablets (4-12 mg total) by mouth nightly as needed (muscle spasm).    TRAMADOL (ULTRAM) 50 MG TABLET traMADol (ULTRAM) 50 mg tablet       TAKE 1 TABLET BY MOUTH EVERY 8 HOURS AS NEEDED    TAKE 1 TABLET BY MOUTH EVERY 8 HOURS AS NEEDED    TRAMADOL (ULTRAM-ER) 300 MG TB24 traMADoL (ULTRAM-ER) 300 MG Tb24       Take 1 tablet (300 mg total) by mouth once daily.    Take 1 tablet (300 mg total) by mouth once daily.   Discontinued Medications    CROMOLYN (GASTROCROM) 100 MG/5 ML SOLUTION    Take  200 mg by mouth.    MONTELUKAST (SINGULAIR) 10 MG TABLET    Take 10 mg by mouth once daily.     RANITIDINE (ZANTAC) 150 MG TABLET    Take 150 mg by mouth.    WALKER MISC    Use daily for safety, rollator walker    WHEELCHAIR RIVER    Travel wheel chair, 1 hour per day         ASSESSMENT AND PLAN:    Problem List Items Addressed This Visit     Sleep apnea    Relevant Medications    modafiniL (PROVIGIL) 200 MG Tab    Mast cell activation syndrome    Overview       04/02/2018  noted         Relevant Medications    modafiniL (PROVIGIL) 200 MG Tab    Joint pain    Relevant Medications    traMADoL (ULTRAM) 50 mg tablet    traMADoL (ULTRAM-ER) 300 MG Tb24    butalbital-acetaminophen-caffeine -40 mg (FIORICET, ESGIC) -40 mg per tablet    Gastroparesis - Primary    Relevant Medications    pantoprazole (PROTONIX) 40 MG tablet    Fibromyalgia    Overview     Since age 14, mainly affects shoulders.         Relevant Medications    traMADoL (ULTRAM) 50 mg tablet    butalbital-acetaminophen-caffeine -40 mg (FIORICET, ESGIC) -40 mg per tablet      Other Visit Diagnoses     Myalgia        Relevant Medications    cyclobenzaprine (FLEXERIL) 10 MG tablet    tiZANidine (ZANAFLEX) 4 MG tablet        Potential medication side effects were discussed with the patient; let me know if any occur.  Try to get her on both of the muscle relaxers.  Spent >40 (44) minutes with the patient with 1/2 time in face to face counseling about the above.      Future Appointments   Date Time Provider Department Center   10/30/2020  1:00 PM TOMASZ Benitez [facility]   10/30/2020  1:40 PM Krystal Coates MD [facility]       No follow-ups on file. or sooner as needed.

## 2020-09-19 ENCOUNTER — PATIENT MESSAGE (OUTPATIENT)
Dept: FAMILY MEDICINE | Facility: CLINIC | Age: 35
End: 2020-09-19

## 2020-10-13 ENCOUNTER — TELEPHONE (OUTPATIENT)
Dept: OTOLARYNGOLOGY | Facility: CLINIC | Age: 35
End: 2020-10-13

## 2020-10-13 NOTE — TELEPHONE ENCOUNTER
Attempted to contact the pt in regards to rescheduling appointment. No answer, left voicemail to return my call.

## 2020-10-14 NOTE — TELEPHONE ENCOUNTER
2nd attempt to contact pt in regards to rescheduling apt. No answer, left voicemail to return my call.

## 2020-10-15 NOTE — TELEPHONE ENCOUNTER
3rd attempt to contact the pt in regards to rescheduling apt. No answer, left voicemail to return my call.

## 2020-10-20 ENCOUNTER — PATIENT MESSAGE (OUTPATIENT)
Dept: OTOLARYNGOLOGY | Facility: CLINIC | Age: 35
End: 2020-10-20

## 2020-10-27 ENCOUNTER — TELEPHONE (OUTPATIENT)
Dept: OTOLARYNGOLOGY | Facility: CLINIC | Age: 35
End: 2020-10-27

## 2020-10-27 NOTE — TELEPHONE ENCOUNTER
Attempted to contact the pt in regards to rescheduling apt. No answer, left voicemail to return my call.

## 2020-10-28 ENCOUNTER — PATIENT OUTREACH (OUTPATIENT)
Dept: ADMINISTRATIVE | Facility: OTHER | Age: 35
End: 2020-10-28

## 2020-10-29 ENCOUNTER — PATIENT MESSAGE (OUTPATIENT)
Dept: OTOLARYNGOLOGY | Facility: CLINIC | Age: 35
End: 2020-10-29

## 2021-05-04 ENCOUNTER — PATIENT MESSAGE (OUTPATIENT)
Dept: RESEARCH | Facility: HOSPITAL | Age: 36
End: 2021-05-04

## 2025-05-15 NOTE — TELEPHONE ENCOUNTER
----- Message from Sarah Kelly sent at 9/20/2019  2:26 PM CDT -----  Contact: self / [phone]  Patient is requesting a call back regarding, has questions about her appointment. Please advise      A PRIOR AUTHORIZATION HAS BEEN INITIATED, PROCESSED AND SENT TO PLAN FOR THIS PATIENT.  AWAITING RESPONSE FROM INSURANCE.      ([facility])